# Patient Record
Sex: MALE | Race: WHITE | Employment: OTHER | URBAN - METROPOLITAN AREA
[De-identification: names, ages, dates, MRNs, and addresses within clinical notes are randomized per-mention and may not be internally consistent; named-entity substitution may affect disease eponyms.]

---

## 2017-05-16 PROBLEM — J96.01 ACUTE RESPIRATORY FAILURE WITH HYPOXIA (HCC): Status: ACTIVE | Noted: 2017-05-16

## 2017-05-20 LAB — LV EF: 49 %

## 2017-06-15 PROBLEM — I50.22 CHRONIC SYSTOLIC CONGESTIVE HEART FAILURE (HCC): Status: ACTIVE | Noted: 2017-06-15

## 2017-06-15 PROBLEM — I10 ESSENTIAL HYPERTENSION: Status: ACTIVE | Noted: 2017-06-15

## 2017-06-15 PROBLEM — J41.0 SIMPLE CHRONIC BRONCHITIS (HCC): Status: ACTIVE | Noted: 2017-06-15

## 2018-04-06 DIAGNOSIS — I50.40 SYSTOLIC AND DIASTOLIC CHF W/REDUCED LV FUNCTION, NYHA CLASS 4 (HCC): ICD-10-CM

## 2018-04-06 DIAGNOSIS — I10 ESSENTIAL HYPERTENSION: ICD-10-CM

## 2018-04-06 RX ORDER — FUROSEMIDE 20 MG/1
20 TABLET ORAL DAILY
Qty: 30 TABLET | Refills: 1 | Status: SHIPPED | OUTPATIENT
Start: 2018-04-06 | End: 2018-04-10 | Stop reason: DRUGHIGH

## 2018-04-06 RX ORDER — FUROSEMIDE 40 MG/1
40 TABLET ORAL DAILY
Qty: 60 TABLET | Refills: 1 | Status: SHIPPED | OUTPATIENT
Start: 2018-04-06 | End: 2018-09-07 | Stop reason: ALTCHOICE

## 2018-04-06 RX ORDER — LISINOPRIL 5 MG/1
5 TABLET ORAL DAILY
Qty: 30 TABLET | Refills: 3 | Status: SHIPPED | OUTPATIENT
Start: 2018-04-06 | End: 2018-10-05 | Stop reason: SDUPTHER

## 2018-04-09 DIAGNOSIS — I10 ESSENTIAL HYPERTENSION: ICD-10-CM

## 2018-04-10 ENCOUNTER — OFFICE VISIT (OUTPATIENT)
Dept: INTERNAL MEDICINE CLINIC | Age: 28
End: 2018-04-10
Payer: MEDICARE

## 2018-04-10 VITALS — DIASTOLIC BLOOD PRESSURE: 97 MMHG | SYSTOLIC BLOOD PRESSURE: 166 MMHG | HEART RATE: 87 BPM

## 2018-04-10 DIAGNOSIS — I50.42 CHRONIC COMBINED SYSTOLIC AND DIASTOLIC CONGESTIVE HEART FAILURE (HCC): Primary | ICD-10-CM

## 2018-04-10 DIAGNOSIS — I10 ESSENTIAL HYPERTENSION: ICD-10-CM

## 2018-04-10 DIAGNOSIS — G47.33 OSA (OBSTRUCTIVE SLEEP APNEA): ICD-10-CM

## 2018-04-10 PROCEDURE — G8427 DOCREV CUR MEDS BY ELIG CLIN: HCPCS | Performed by: INTERNAL MEDICINE

## 2018-04-10 PROCEDURE — 99213 OFFICE O/P EST LOW 20 MIN: CPT | Performed by: INTERNAL MEDICINE

## 2018-04-10 PROCEDURE — G8417 CALC BMI ABV UP PARAM F/U: HCPCS | Performed by: INTERNAL MEDICINE

## 2018-04-10 PROCEDURE — 1036F TOBACCO NON-USER: CPT | Performed by: INTERNAL MEDICINE

## 2018-04-10 RX ORDER — FUROSEMIDE 40 MG/1
40 TABLET ORAL DAILY
Qty: 30 TABLET | Refills: 3 | Status: SHIPPED | OUTPATIENT
Start: 2018-04-10 | End: 2018-07-16 | Stop reason: SDUPTHER

## 2018-07-16 RX ORDER — FUROSEMIDE 40 MG/1
40 TABLET ORAL DAILY
Qty: 30 TABLET | Refills: 3 | Status: SHIPPED | OUTPATIENT
Start: 2018-07-16 | End: 2018-07-25 | Stop reason: SDUPTHER

## 2018-07-28 RX ORDER — FUROSEMIDE 40 MG/1
40 TABLET ORAL DAILY
Qty: 30 TABLET | Refills: 3 | Status: SHIPPED | OUTPATIENT
Start: 2018-07-28 | End: 2018-09-07 | Stop reason: ALTCHOICE

## 2018-09-06 DIAGNOSIS — I10 ESSENTIAL HYPERTENSION: ICD-10-CM

## 2018-09-06 DIAGNOSIS — I50.40 SYSTOLIC AND DIASTOLIC CHF W/REDUCED LV FUNCTION, NYHA CLASS 4 (HCC): ICD-10-CM

## 2018-09-07 RX ORDER — FUROSEMIDE 20 MG/1
20 TABLET ORAL DAILY
Qty: 30 TABLET | Refills: 1 | Status: SHIPPED | OUTPATIENT
Start: 2018-09-07 | End: 2019-02-05 | Stop reason: CLARIF

## 2018-09-12 ENCOUNTER — TELEPHONE (OUTPATIENT)
Dept: INTERNAL MEDICINE CLINIC | Age: 28
End: 2018-09-12

## 2018-09-13 NOTE — TELEPHONE ENCOUNTER
Patient would like to know why the lasix 40 mg has been discontinued. Patient states that he has been taking both the 20 mg and 40 mg. Please advise. Thanks.

## 2018-09-18 DIAGNOSIS — I50.22 CHRONIC SYSTOLIC CONGESTIVE HEART FAILURE (HCC): Primary | ICD-10-CM

## 2018-09-18 RX ORDER — FUROSEMIDE 40 MG/1
40 TABLET ORAL DAILY
Qty: 30 TABLET | Refills: 3 | Status: SHIPPED | OUTPATIENT
Start: 2018-09-18 | End: 2019-02-05 | Stop reason: CLARIF

## 2018-10-05 DIAGNOSIS — I10 ESSENTIAL HYPERTENSION: ICD-10-CM

## 2018-10-05 RX ORDER — LISINOPRIL 5 MG/1
5 TABLET ORAL DAILY
Qty: 30 TABLET | Refills: 3 | Status: SHIPPED | OUTPATIENT
Start: 2018-10-05 | End: 2019-02-12 | Stop reason: SDUPTHER

## 2019-01-11 DIAGNOSIS — I10 ESSENTIAL HYPERTENSION: ICD-10-CM

## 2019-02-05 RX ORDER — FUROSEMIDE 40 MG/1
TABLET ORAL
Qty: 90 TABLET | Refills: 0 | Status: SHIPPED | OUTPATIENT
Start: 2019-02-05 | End: 2019-09-03 | Stop reason: SDUPTHER

## 2019-02-06 DIAGNOSIS — I10 ESSENTIAL HYPERTENSION: ICD-10-CM

## 2019-02-06 DIAGNOSIS — I50.40 SYSTOLIC AND DIASTOLIC CHF W/REDUCED LV FUNCTION, NYHA CLASS 4 (HCC): ICD-10-CM

## 2019-02-06 RX ORDER — LISINOPRIL 5 MG/1
5 TABLET ORAL DAILY
Qty: 30 TABLET | Refills: 0 | Status: CANCELLED | OUTPATIENT
Start: 2019-02-06

## 2019-02-06 RX ORDER — FUROSEMIDE 40 MG/1
TABLET ORAL
Qty: 30 TABLET | Refills: 0 | Status: CANCELLED | OUTPATIENT
Start: 2019-02-06

## 2019-02-06 RX ORDER — FUROSEMIDE 20 MG/1
20 TABLET ORAL DAILY
Qty: 30 TABLET | Refills: 0 | Status: CANCELLED | OUTPATIENT
Start: 2019-02-06

## 2019-02-12 ENCOUNTER — OFFICE VISIT (OUTPATIENT)
Dept: INTERNAL MEDICINE CLINIC | Age: 29
End: 2019-02-12

## 2019-02-12 VITALS
TEMPERATURE: 98.8 F | WEIGHT: 315 LBS | BODY MASS INDEX: 41.75 KG/M2 | HEIGHT: 73 IN | DIASTOLIC BLOOD PRESSURE: 104 MMHG | SYSTOLIC BLOOD PRESSURE: 174 MMHG | HEART RATE: 75 BPM | OXYGEN SATURATION: 96 %

## 2019-02-12 DIAGNOSIS — R10.12 LEFT UPPER QUADRANT PAIN: Primary | ICD-10-CM

## 2019-02-12 DIAGNOSIS — I50.22 CHRONIC SYSTOLIC CONGESTIVE HEART FAILURE (HCC): ICD-10-CM

## 2019-02-12 DIAGNOSIS — I10 ESSENTIAL HYPERTENSION: ICD-10-CM

## 2019-02-12 DIAGNOSIS — R07.9 CHEST PAIN, UNSPECIFIED TYPE: ICD-10-CM

## 2019-02-12 PROCEDURE — 99213 OFFICE O/P EST LOW 20 MIN: CPT | Performed by: INTERNAL MEDICINE

## 2019-02-12 RX ORDER — FUROSEMIDE 20 MG/1
20 TABLET ORAL DAILY
COMMUNITY
End: 2019-09-17

## 2019-02-12 RX ORDER — LISINOPRIL 5 MG/1
5 TABLET ORAL DAILY
Qty: 30 TABLET | Refills: 3 | Status: SHIPPED | OUTPATIENT
Start: 2019-02-12 | End: 2019-06-25 | Stop reason: SDUPTHER

## 2019-02-12 RX ORDER — FUROSEMIDE 20 MG/1
20 TABLET ORAL DAILY
Qty: 30 TABLET | Refills: 3 | Status: SHIPPED | OUTPATIENT
Start: 2019-02-12 | End: 2019-09-03 | Stop reason: SDUPTHER

## 2019-02-12 ASSESSMENT — PATIENT HEALTH QUESTIONNAIRE - PHQ9
2. FEELING DOWN, DEPRESSED OR HOPELESS: 0
SUM OF ALL RESPONSES TO PHQ QUESTIONS 1-9: 0
SUM OF ALL RESPONSES TO PHQ9 QUESTIONS 1 & 2: 0
SUM OF ALL RESPONSES TO PHQ QUESTIONS 1-9: 0
1. LITTLE INTEREST OR PLEASURE IN DOING THINGS: 0

## 2019-02-13 ENCOUNTER — TELEPHONE (OUTPATIENT)
Dept: INTERNAL MEDICINE CLINIC | Age: 29
End: 2019-02-13

## 2019-04-30 DIAGNOSIS — I10 ESSENTIAL HYPERTENSION: ICD-10-CM

## 2019-06-25 ENCOUNTER — TELEPHONE (OUTPATIENT)
Dept: INTERNAL MEDICINE CLINIC | Age: 29
End: 2019-06-25

## 2019-06-25 DIAGNOSIS — I10 ESSENTIAL HYPERTENSION: ICD-10-CM

## 2019-06-25 RX ORDER — LISINOPRIL 5 MG/1
5 TABLET ORAL DAILY
Qty: 30 TABLET | Refills: 3 | Status: SHIPPED | OUTPATIENT
Start: 2019-06-25 | End: 2019-09-03 | Stop reason: SDUPTHER

## 2019-06-25 NOTE — TELEPHONE ENCOUNTER
Contacted patient to remind him that Dr Nydia Treviño would like him to get his blood work done. Patient voiced understanding of this and will try to get the blood work done at [facility] on a weekend.

## 2019-09-03 DIAGNOSIS — I10 ESSENTIAL HYPERTENSION: ICD-10-CM

## 2019-09-03 RX ORDER — LISINOPRIL 5 MG/1
5 TABLET ORAL DAILY
Qty: 30 TABLET | Refills: 0 | Status: SHIPPED | OUTPATIENT
Start: 2019-09-03 | End: 2019-09-17 | Stop reason: SDUPTHER

## 2019-09-03 RX ORDER — FUROSEMIDE 40 MG/1
40 TABLET ORAL DAILY
Qty: 30 TABLET | Refills: 0 | Status: SHIPPED | OUTPATIENT
Start: 2019-09-03 | End: 2019-09-17 | Stop reason: SDUPTHER

## 2019-09-03 RX ORDER — FUROSEMIDE 20 MG/1
20 TABLET ORAL DAILY
Qty: 30 TABLET | Refills: 0 | Status: SHIPPED | OUTPATIENT
Start: 2019-09-03 | End: 2019-09-17 | Stop reason: SDUPTHER

## 2019-09-03 NOTE — TELEPHONE ENCOUNTER
Patient called requesting medication refills. Advised patient that he has cancelled 2 appointments and hasn't got his labs completed that Marianna Fairchild advised him about 6/25/2019. Offered him an appointment for today  Patient states he is out of town. Did schedule for 2 weeks September 17 2019. Advised only a 2 week supply will be called in to get him to his appointment. I reminded him to get labs completed as well. I asked him if he still had the lab orders he stated that registration has his paperwork because he was not fasting the time he went to complete them. Advised the day he gets them done if they do not have his paperwork to come to office and we will re print them. Advised him strongly to have labs completed before his visit.

## 2019-09-09 ENCOUNTER — HOSPITAL ENCOUNTER (OUTPATIENT)
Age: 29
Discharge: HOME | End: 2019-09-09

## 2019-09-09 DIAGNOSIS — I10 ESSENTIAL HYPERTENSION: ICD-10-CM

## 2019-09-09 LAB
ANION GAP SERPL CALCULATED.3IONS-SCNC: 9 MMOL/L (ref 7–16)
BUN BLDV-MCNC: 9 MG/DL (ref 6–20)
CALCIUM SERPL-MCNC: 9.4 MG/DL (ref 8.6–10.2)
CHLORIDE BLD-SCNC: 99 MMOL/L (ref 98–107)
CO2: 31 MMOL/L (ref 22–29)
CREAT SERPL-MCNC: 0.7 MG/DL (ref 0.7–1.2)
GFR AFRICAN AMERICAN: >60
GFR NON-AFRICAN AMERICAN: >60 ML/MIN/1.73
GLUCOSE BLD-MCNC: 106 MG/DL (ref 74–99)
POTASSIUM SERPL-SCNC: 4 MMOL/L (ref 3.5–5)
SODIUM BLD-SCNC: 139 MMOL/L (ref 132–146)

## 2019-09-09 PROCEDURE — 36415 COLL VENOUS BLD VENIPUNCTURE: CPT

## 2019-09-09 PROCEDURE — 80048 BASIC METABOLIC PNL TOTAL CA: CPT

## 2019-09-17 ENCOUNTER — OFFICE VISIT (OUTPATIENT)
Dept: INTERNAL MEDICINE CLINIC | Age: 29
End: 2019-09-17

## 2019-09-17 VITALS
SYSTOLIC BLOOD PRESSURE: 140 MMHG | OXYGEN SATURATION: 94 % | WEIGHT: 315 LBS | DIASTOLIC BLOOD PRESSURE: 84 MMHG | TEMPERATURE: 98.1 F | BODY MASS INDEX: 41.75 KG/M2 | HEIGHT: 73 IN | HEART RATE: 84 BPM

## 2019-09-17 DIAGNOSIS — I50.40 SYSTOLIC AND DIASTOLIC CHF W/REDUCED LV FUNCTION, NYHA CLASS 4 (HCC): ICD-10-CM

## 2019-09-17 DIAGNOSIS — E11.9 TYPE 2 DIABETES MELLITUS WITHOUT COMPLICATION, WITHOUT LONG-TERM CURRENT USE OF INSULIN (HCC): ICD-10-CM

## 2019-09-17 DIAGNOSIS — I10 ESSENTIAL HYPERTENSION: Primary | ICD-10-CM

## 2019-09-17 PROCEDURE — 99213 OFFICE O/P EST LOW 20 MIN: CPT | Performed by: INTERNAL MEDICINE

## 2019-09-17 RX ORDER — LISINOPRIL 5 MG/1
10 TABLET ORAL DAILY
Qty: 60 TABLET | Refills: 5 | Status: SHIPPED | OUTPATIENT
Start: 2019-09-17 | End: 2020-03-17 | Stop reason: SDUPTHER

## 2019-09-17 RX ORDER — FUROSEMIDE 40 MG/1
40 TABLET ORAL DAILY
Qty: 30 TABLET | Refills: 5 | Status: SHIPPED | OUTPATIENT
Start: 2019-09-17 | End: 2020-03-17 | Stop reason: SDUPTHER

## 2019-09-17 RX ORDER — FUROSEMIDE 20 MG/1
20 TABLET ORAL DAILY
Qty: 30 TABLET | Refills: 5 | Status: SHIPPED | OUTPATIENT
Start: 2019-09-17 | End: 2020-03-17 | Stop reason: SDUPTHER

## 2019-09-17 NOTE — PROGRESS NOTES
Internal Medicine Clinic Progress Note    NAME: Loren Bower        :  1990        MRN:  [id number]    No chief complaint on file. History of Present Illness  2019  Patient presents for routine follow-up and medication refills. States that he has been taking his medications for the last week and a half. However he is not compliant with his BiPAP. Otherwise states breathing is stable and has not needed rescue inhaler. Denies any chest pain. 2019  Patient presents for follow up of chf and medication refills. States he has not been taking prescribed lasix due to running out in the last 2 weeks. Otherwise sleep is improved and he feels well rested afterwards. Has not been using cpap. Breathing is stable. Admits to left upper quadrant pain worse with prolonged exertion and greasy foods. Denies fever, chills, nausea, emesis or diarrhea. 4/10/2018  Patient states he is breathing better however he has not been taking 60 mg of lasix. Patient only taking 20 mg. States he just got insurance and so should be able to get his bipap. Patient otherwise denies chest pain or shortness of breath. Review of Systems  Please see HPI above. All bolded are positive.   Gen: fever, chills, fatigue, weakness, diaphoresis, or unintentional weight change  Head: headache, vision change, hearing loss  Chest: chest pain/heaviness, palpitations  Lungs: shortness of breath, wheezing, coughing, hemoptysis  Abdomen: abdominal pain, nausea, vomiting, diarrhea, constipation, melena, hematochezia, hematemesis, or loss of appetite  Extremities: lower extremity edema, myalgias, arthralgias  Urinary: dysuria, hematuria, weak flow, or increase in frequency  Neurologic: lightheadedness, dizziness, confusion, syncope  Psychiatric: depression, suicidal ideation, or anxiety      Past Medical History:   Diagnosis Date    COPD (chronic obstructive pulmonary disease) (HCC)     HTN (hypertension)     Systolic and likely from running out of medications  Refuses flu vaccination     4/10/2018  States breathing has improved. Continue with 40 mg lasix  Order bipap  Have him back for recheck of blood pressure in 2 weeks  Watch salt intake  Counseled on weight loss  Repeat echocardiogram   Obtain blood work    Chronic stage 1 diastolic/systolic congestive heart failure [facility] class 4 with recent hospitalization  - EF of 49 %;   - Off the oxygen for last 4 days  - with ambulation maintained saturation of 88%  - started on lisinopril and Lasix  - started on metoprolol 12.5 mg daily; patient tolerating   - advised of 2 liter fluid restriction and decreased salt intake, record daily weights   - patient taking 60 mg of lasix     8/3/2017  Advised patient to continue taking lopressor and lisinopril as patient stated that he stopped taking. Lisinopril dose was decreased. He was counseled to take it daily and if having hypotension, lightheadedness, or other related symptoms to take it every other day. Patient stated agreement and understanding. Patient continues to take 60 mg lasix. Advised of fluid restriction of 2 liters .      Hx of apoorva  - follow up pulmonology in a month with Dr. Bekah Stern  - sleep study done with bipap/cpap titration  - has been noncompliant in the past due to financial difficulty  - made arrangements for bipap; he is in the process of obtaining bipap with social work  -  Still working on it     8/3/2017  Patient purchased bipap online. I advised him to continue to follow up with social work to obtain bipap.  He stated that he would .          COPD; will need PFT in the future  Will take anoro and see how he does; will add steroid if not controlled     8/3/2017  Was taking the Deidre Manzanilla; stopped taking it due to abdominal pain   Will need  PFT for formal evaluation   2 week visit Dr. Dion Cormier      Hypertension  Started on lisinopril and lasix  Metoprolol  controlled      Pulmonary HTN  Maybe due to acute exacerbation of CHF      Super morbid Obesity  advised about diet and exercise      Healthcare maintenance  Routine lab work (cbc, cmp, lipid panel, hiv screen, mg, phos, tsh, vitamin d); repeat blood work. Colonoscopy (age 48 with no risk factors, 40 or ten years earlier, whichever is first, in those with relatives of crc or adenoma before age 61; or crc or adenoma in 2 or more first degree relatives)   Vaccination (pneumovax, tetanus booster)      8/3/2017  Patient given repeat paper for bloodwork   Needs to get these as he is taking lasix                                                Health Maintenance Due   Topic Date Due    Pneumococcal 0-64 years Vaccine (1 of 1 - PPSV23) 06/09/1995    Varicella Vaccine (1 of 2 - 13+ 2-dose series) 06/09/2002    HIV screen  06/09/2004    DTaP/Tdap/Td vaccine (1 - Tdap) 06/09/2008    Flu vaccine (1) 09/01/2019     This patient was seen and discussed with Dr. Mehran Brooks.     [place], [place]  9/17/2019  2:24 PM

## 2020-03-17 RX ORDER — LISINOPRIL 5 MG/1
10 TABLET ORAL DAILY
Qty: 60 TABLET | Refills: 0 | Status: SHIPPED
Start: 2020-03-17 | End: 2020-04-10 | Stop reason: SDUPTHER

## 2020-03-17 RX ORDER — FUROSEMIDE 40 MG/1
40 TABLET ORAL DAILY
Qty: 30 TABLET | Refills: 0 | Status: SHIPPED
Start: 2020-03-17 | End: 2020-04-10 | Stop reason: SDUPTHER

## 2020-03-17 RX ORDER — FUROSEMIDE 20 MG/1
20 TABLET ORAL DAILY
Qty: 30 TABLET | Refills: 0 | Status: SHIPPED
Start: 2020-03-17 | End: 2020-04-10 | Stop reason: SDUPTHER

## 2020-03-17 NOTE — TELEPHONE ENCOUNTER
Patient was former Dr Shawna Ramirez patient. Needing a Friday D/T work schedule.  Patient will be seeing you on April 10 but only has 1 more week of refills can you send in 1 month to get him to his next appointment

## 2020-04-10 ENCOUNTER — HOSPITAL ENCOUNTER (OUTPATIENT)
Age: 30
Discharge: HOME | End: 2020-04-10

## 2020-04-10 ENCOUNTER — OFFICE VISIT (OUTPATIENT)
Dept: INTERNAL MEDICINE CLINIC | Age: 30
End: 2020-04-10

## 2020-04-10 VITALS
OXYGEN SATURATION: 93 % | BODY MASS INDEX: 41.75 KG/M2 | SYSTOLIC BLOOD PRESSURE: 135 MMHG | DIASTOLIC BLOOD PRESSURE: 75 MMHG | HEART RATE: 72 BPM | TEMPERATURE: 98.1 F | HEIGHT: 73 IN | WEIGHT: 315 LBS

## 2020-04-10 LAB
ALBUMIN SERPL-MCNC: 4.2 G/DL (ref 3.5–5.2)
ALP BLD-CCNC: 60 U/L (ref 40–129)
ALT SERPL-CCNC: 33 U/L (ref 0–40)
ANION GAP SERPL CALCULATED.3IONS-SCNC: 11 MMOL/L (ref 7–16)
AST SERPL-CCNC: 27 U/L (ref 0–39)
BASOPHILS ABSOLUTE: 0.08 E9/L (ref 0–0.2)
BASOPHILS RELATIVE PERCENT: 0.8 % (ref 0–2)
BILIRUB SERPL-MCNC: 0.5 MG/DL (ref 0–1.2)
BUN BLDV-MCNC: 10 MG/DL (ref 6–20)
CALCIUM SERPL-MCNC: 9.6 MG/DL (ref 8.6–10.2)
CHLORIDE BLD-SCNC: 102 MMOL/L (ref 98–107)
CHOLESTEROL, FASTING: 118 MG/DL (ref 0–199)
CO2: 24 MMOL/L (ref 22–29)
CREAT SERPL-MCNC: 0.8 MG/DL (ref 0.7–1.2)
EOSINOPHILS ABSOLUTE: 0.07 E9/L (ref 0.05–0.5)
EOSINOPHILS RELATIVE PERCENT: 0.7 % (ref 0–6)
GFR AFRICAN AMERICAN: >60
GFR NON-AFRICAN AMERICAN: >60 ML/MIN/1.73
GLUCOSE FASTING: 90 MG/DL (ref 74–99)
HBA1C MFR BLD: 5.4 % (ref 4–5.6)
HCT VFR BLD CALC: 47 % (ref 37–54)
HDLC SERPL-MCNC: 41 MG/DL
HEMOGLOBIN: 15 G/DL (ref 12.5–16.5)
IMMATURE GRANULOCYTES #: 0.02 E9/L
IMMATURE GRANULOCYTES %: 0.2 % (ref 0–5)
LDL CHOLESTEROL CALCULATED: 61 MG/DL (ref 0–99)
LYMPHOCYTES ABSOLUTE: 3.1 E9/L (ref 1.5–4)
LYMPHOCYTES RELATIVE PERCENT: 31.8 % (ref 20–42)
MAGNESIUM: 2.2 MG/DL (ref 1.6–2.6)
MCH RBC QN AUTO: 29.8 PG (ref 26–35)
MCHC RBC AUTO-ENTMCNC: 31.9 % (ref 32–34.5)
MCV RBC AUTO: 93.4 FL (ref 80–99.9)
MONOCYTES ABSOLUTE: 0.63 E9/L (ref 0.1–0.95)
MONOCYTES RELATIVE PERCENT: 6.5 % (ref 2–12)
NEUTROPHILS ABSOLUTE: 5.85 E9/L (ref 1.8–7.3)
NEUTROPHILS RELATIVE PERCENT: 60 % (ref 43–80)
PDW BLD-RTO: 14.1 FL (ref 11.5–15)
PHOSPHORUS: 3.2 MG/DL (ref 2.5–4.5)
PLATELET # BLD: 321 E9/L (ref 130–450)
PMV BLD AUTO: 10.6 FL (ref 7–12)
POTASSIUM SERPL-SCNC: 4.6 MMOL/L (ref 3.5–5)
RBC # BLD: 5.03 E12/L (ref 3.8–5.8)
SODIUM BLD-SCNC: 137 MMOL/L (ref 132–146)
TOTAL PROTEIN: 7.8 G/DL (ref 6.4–8.3)
TRIGLYCERIDE, FASTING: 81 MG/DL (ref 0–149)
VLDLC SERPL CALC-MCNC: 16 MG/DL
WBC # BLD: 9.8 E9/L (ref 4.5–11.5)

## 2020-04-10 PROCEDURE — 84100 ASSAY OF PHOSPHORUS: CPT

## 2020-04-10 PROCEDURE — 36415 COLL VENOUS BLD VENIPUNCTURE: CPT

## 2020-04-10 PROCEDURE — 83036 HEMOGLOBIN GLYCOSYLATED A1C: CPT

## 2020-04-10 PROCEDURE — 99212 OFFICE O/P EST SF 10 MIN: CPT | Performed by: FAMILY MEDICINE

## 2020-04-10 PROCEDURE — 80053 COMPREHEN METABOLIC PANEL: CPT

## 2020-04-10 PROCEDURE — 80061 LIPID PANEL: CPT

## 2020-04-10 PROCEDURE — 85025 COMPLETE CBC W/AUTO DIFF WBC: CPT

## 2020-04-10 PROCEDURE — 99213 OFFICE O/P EST LOW 20 MIN: CPT | Performed by: FAMILY MEDICINE

## 2020-04-10 PROCEDURE — 83735 ASSAY OF MAGNESIUM: CPT

## 2020-04-10 RX ORDER — LISINOPRIL 5 MG/1
10 TABLET ORAL DAILY
Qty: 60 TABLET | Refills: 0 | Status: SHIPPED
Start: 2020-04-10 | End: 2020-04-13 | Stop reason: SDUPTHER

## 2020-04-10 RX ORDER — FUROSEMIDE 40 MG/1
40 TABLET ORAL DAILY
Qty: 30 TABLET | Refills: 0 | Status: SHIPPED
Start: 2020-04-10 | End: 2020-04-13 | Stop reason: SDUPTHER

## 2020-04-10 RX ORDER — METOPROLOL SUCCINATE 25 MG/1
25 TABLET, EXTENDED RELEASE ORAL DAILY
Qty: 90 TABLET | Refills: 1 | Status: SHIPPED
Start: 2020-04-10 | End: 2020-04-13 | Stop reason: SDUPTHER

## 2020-04-10 RX ORDER — FUROSEMIDE 20 MG/1
20 TABLET ORAL DAILY
Qty: 30 TABLET | Refills: 0 | Status: SHIPPED
Start: 2020-04-10 | End: 2020-04-13 | Stop reason: SDUPTHER

## 2020-04-10 ASSESSMENT — ENCOUNTER SYMPTOMS
DIARRHEA: 0
WHEEZING: 0
NAUSEA: 0
SORE THROAT: 0
CONSTIPATION: 0
SHORTNESS OF BREATH: 0
VOMITING: 0
PHOTOPHOBIA: 0
COUGH: 0
RHINORRHEA: 0

## 2020-04-10 ASSESSMENT — PATIENT HEALTH QUESTIONNAIRE - PHQ9
SUM OF ALL RESPONSES TO PHQ QUESTIONS 1-9: 0
1. LITTLE INTEREST OR PLEASURE IN DOING THINGS: 0
SUM OF ALL RESPONSES TO PHQ9 QUESTIONS 1 & 2: 0
2. FEELING DOWN, DEPRESSED OR HOPELESS: 0
SUM OF ALL RESPONSES TO PHQ QUESTIONS 1-9: 0

## 2020-04-10 NOTE — PROGRESS NOTES
[facility]    Subjective:  26 y.o. male who presents in office today regarding  to establish with a new provider    Established with provider  The patient's last PCP was Dr. Edgard Verdugo last seen Sept 2019. The patient is also under the care of no other physicians. Active comorbidities include:  HTN - Lisinopril 10 mg daily  Combined CHF - Lasix BID, 40 +20. COPD  Pt was on O2 some time ago following hospitalization and says he doesn't often feel SOB but has low O2 saturation frequently. These tend to be in the 80s. He is not on any therapy for this and doesn't have any formal testing in his past. He never smoked but grew up with significant second-hand smoke exposure and was born premature. Obesity  The pt has been on the keto diet for over a year and continues to lose weight. He reports improved breathing and energy. Past Medical History:   Diagnosis Date    COPD (chronic obstructive pulmonary disease) (HCC)     HTN (hypertension)     Systolic and diastolic CHF w/reduced LV function, NYHA class 4 (HCC)        Past Surgical History:   Procedure Laterality Date    TONSILLECTOMY         Family History   Problem Relation Age of Onset    Asthma Maternal Uncle     Diabetes Maternal Grandmother     Heart Disease Maternal Grandmother     COPD Paternal Grandmother        Social History     Tobacco History     Smoking Status  Never Smoker    Smokeless Tobacco Use  Never Used          Alcohol History     Alcohol Use Status  Yes Comment  occ          Drug Use     Drug Use Status  No          Sexual Activity     Sexually Active  Not Asked            Parents and siblings smoked growing up and pt noted he was born premature. Lives with sister in his apt. No children.     Allergies   Allergen Reactions    Sulfa Antibiotics Rash       Current Outpatient Medications on File Prior to Visit   Medication Sig Dispense Refill    furosemide (LASIX) 40 MG tablet Take 1 tablet by mouth daily 30 tablet 0   

## 2020-04-10 NOTE — PATIENT INSTRUCTIONS
breathe in and out as deeply and quickly as you can. · You may repeat some tests after you inhale a medicine that expands your airways. · You may breathe certain gases, such as 100% oxygen or a mixture of helium and air. · For body plethysmography, you sit inside a small fnik with windows. The fink measures pressure changes that occur as you breathe. The therapist may urge you to breathe deeply during some of the tests to get the best results. You may have a blood test to check oxygen and carbon dioxide levels in your blood before, during, or after your lung function tests. How long do these tests take? The testing may take from 5 to 30 minutes, depending on how many tests you have. What happens after these tests? · You will probably be able to go home right after the tests. · You can go back to your normal activities right away. Follow-up care is a key part of your treatment and safety. Be sure to make and go to all appointments, and call your doctor if you are having problems. It's also a good idea to know your test results and keep a list of the medicines you take. Where can you learn more? Go to [email or web address]://[email or web address].[email or web address]. org and sign in to your [facility] account. Enter E446 in the [employer] box to learn more about \"Lung Function Tests: About These Tests. \"     If you do not have an account, please click on the \"Sign Up Now\" link. Current as of: June 9, 2019Content Version: 12.4  © 9838-8364 Healthwise, Incorporated. Care instructions adapted under license by [facility] ([facility]). If you have questions about a medical condition or this instruction, always ask your healthcare professional. [place] [address] any warranty or liability for your use of this information.

## 2020-04-13 RX ORDER — LISINOPRIL 5 MG/1
10 TABLET ORAL DAILY
Qty: 60 TABLET | Refills: 0 | Status: SHIPPED
Start: 2020-04-13 | End: 2020-06-12 | Stop reason: SDUPTHER

## 2020-04-13 RX ORDER — METOPROLOL SUCCINATE 25 MG/1
25 TABLET, EXTENDED RELEASE ORAL DAILY
Qty: 90 TABLET | Refills: 1 | Status: SHIPPED
Start: 2020-04-13 | End: 2020-06-12 | Stop reason: SDUPTHER

## 2020-04-13 RX ORDER — FUROSEMIDE 20 MG/1
20 TABLET ORAL DAILY
Qty: 30 TABLET | Refills: 0 | Status: SHIPPED
Start: 2020-04-13 | End: 2020-06-12 | Stop reason: SDUPTHER

## 2020-04-13 RX ORDER — FUROSEMIDE 40 MG/1
40 TABLET ORAL DAILY
Qty: 30 TABLET | Refills: 0 | Status: SHIPPED
Start: 2020-04-13 | End: 2020-06-12 | Stop reason: SDUPTHER

## 2020-06-03 ENCOUNTER — HOSPITAL ENCOUNTER (EMERGENCY)
Age: 30
Discharge: HOME | End: 2020-06-03

## 2020-06-03 VITALS
HEIGHT: 74 IN | DIASTOLIC BLOOD PRESSURE: 98 MMHG | RESPIRATION RATE: 18 BRPM | HEART RATE: 91 BPM | OXYGEN SATURATION: 97 % | WEIGHT: 315 LBS | TEMPERATURE: 97.9 F | SYSTOLIC BLOOD PRESSURE: 170 MMHG | BODY MASS INDEX: 40.43 KG/M2

## 2020-06-03 PROCEDURE — 6360000002 HC RX W HCPCS: Performed by: PHYSICIAN ASSISTANT

## 2020-06-03 PROCEDURE — 90471 IMMUNIZATION ADMIN: CPT | Performed by: PHYSICIAN ASSISTANT

## 2020-06-03 PROCEDURE — 99282 EMERGENCY DEPT VISIT SF MDM: CPT

## 2020-06-03 PROCEDURE — 90715 TDAP VACCINE 7 YRS/> IM: CPT | Performed by: PHYSICIAN ASSISTANT

## 2020-06-03 PROCEDURE — 12002 RPR S/N/AX/GEN/TRNK2.6-7.5CM: CPT

## 2020-06-03 RX ADMIN — TETANUS TOXOID, REDUCED DIPHTHERIA TOXOID AND ACELLULAR PERTUSSIS VACCINE, ADSORBED 0.5 ML: 5; 2.5; 8; 8; 2.5 SUSPENSION INTRAMUSCULAR at 23:34

## 2020-06-04 ENCOUNTER — CARE COORDINATION (OUTPATIENT)
Dept: CARE COORDINATION | Age: 30
End: 2020-06-04

## 2020-06-04 NOTE — ED PROVIDER NOTES
[facility]                                                                                                                                    Department of Emergency Medicine   ED  Provider Note  Admit Date/RoomTime: 6/3/2020 10:55 PM  ED Room: 08/08        HPI:  6/3/20,   Time: 11:30 PM EDT         Bebe Barnes is a 26 y.o. male presenting to the ED for left hand laceration, beginning just prior to arrival.  The complaint has been persistent, mild in severity, and worsened by movement of left hand. The patient states that he was doing some target shooting at home when he advertently cut the back of his left hand. He states that he cleaned up the area pretty thoroughly at home but it would not stop bleeding. He states that his mom is the one who insisted he come in to be evaluated. The patient reports that he is moving his fingers well. Denies any loss of sensation. Last tetanus is uncertain. The patient has no other injuries or complaints. ROS:     Constitutional: Negative for fever and chills  HENT: Negative for ear pain, sore throat and sinus pressure  Eyes: Negative for pain, discharge and redness  Respiratory:  Negative for shortness of breath, cough and wheezing  Cardiovascular: Negative for CP, edema or palpitations  Gastrointestinal: Negative for nausea, vomiting, diarrhea and abdominal distention  Genitourinary: Negative for dysuria and frequency  Musculoskeletal: See  HPI  Skin: See HPI  Neurological: Negative for weakness and headaches  Hematological: Negative for adenopathy    All other systems reviewed and are negative      -------------------------------- PAST HISTORY ----------------------------------  Past Medical History:  has a past medical history of COPD (chronic obstructive pulmonary disease) ([facility] [address] HTN (hypertension), and Systolic and diastolic CHF w/reduced LV function, NYHA class 4 ([address]     Past Surgical History:  has a past surgical history that includes Tonsillectomy. Social History:  reports that he has never smoked. He has never used smokeless tobacco. He reports current alcohol use. He reports that he does not use drugs. Family History: family history includes Asthma in his maternal uncle; COPD in his paternal grandmother; Diabetes in his maternal grandmother; Heart Disease in his maternal grandmother. The patients home medications have been reviewed. Allergies: Sulfa antibiotics    --------------------------------- RESULTS ------------------------------------------  All laboratory and radiology results have been personally reviewed by myself   LABS:  No results found for this visit on 06/03/20. RADIOLOGY:  Interpreted by Radiologist.  No orders to display       ----------------- NURSING NOTES AND VITALS REVIEWED ---------------   The nursing notes within the ED encounter and vital signs as below have been reviewed. BP (!) 170/98   Pulse 91   Temp 97.9 °F (36.6 °C) (Oral)   Resp 18   Ht 6' 2\" (1.88 m)   Wt (!) 383 lb (173.7 kg)   SpO2 97%   BMI 49.17 kg/m²   Oxygen Saturation Interpretation: Normal      --------------------------------PHYSICAL EXAM------------------------------------      Constitutional/General: Alert and oriented x3, well appearing, non toxic in NAD  Head: NC/AT  Eyes: PERRL, EOMI  Mouth: Oropharynx clear, handling secretions, no trismus  Neck: Supple, full ROM, no meningeal signs  Pulmonary: Lungs clear to auscultation bilaterally, no wheezes, rales, or rhonchi. Not in respiratory distress  Cardiovascular:  Regular rate and rhythm, no murmurs, gallops, or rubs. 2+ distal pulses  Extremities: Moves all extremities x 4.  3 cm laceration noted to the dorsal surface of the left hand over the fourth and fifth metacarpals. Mild gaping and bleeding noted at the site. warm and well perfused  Skin: warm and dry without rash  Neurologic: GCS 15,  Intact.   No focal deficits  Psych: Normal

## 2020-06-04 NOTE — CARE COORDINATION
patient will be contacted by nurse care manager for worsening symptoms. Pt will be further monitored by COVID Loop Team based on severity of symptoms and risk factors.

## 2020-06-12 RX ORDER — FUROSEMIDE 40 MG/1
40 TABLET ORAL DAILY
Qty: 30 TABLET | Refills: 0 | Status: SHIPPED
Start: 2020-06-12 | End: 2020-07-21 | Stop reason: SDUPTHER

## 2020-06-12 RX ORDER — LISINOPRIL 5 MG/1
10 TABLET ORAL DAILY
Qty: 60 TABLET | Refills: 0 | Status: SHIPPED
Start: 2020-06-12 | End: 2020-07-21 | Stop reason: SDUPTHER

## 2020-06-12 RX ORDER — METOPROLOL SUCCINATE 25 MG/1
25 TABLET, EXTENDED RELEASE ORAL DAILY
Qty: 90 TABLET | Refills: 1 | Status: SHIPPED
Start: 2020-06-12 | End: 2020-07-21 | Stop reason: SDUPTHER

## 2020-06-12 RX ORDER — FUROSEMIDE 20 MG/1
20 TABLET ORAL DAILY
Qty: 30 TABLET | Refills: 0 | Status: SHIPPED
Start: 2020-06-12 | End: 2020-07-21 | Stop reason: SDUPTHER

## 2020-07-21 ENCOUNTER — OFFICE VISIT (OUTPATIENT)
Dept: FAMILY MEDICINE CLINIC | Age: 30
End: 2020-07-21

## 2020-07-21 VITALS
WEIGHT: 315 LBS | HEIGHT: 74 IN | DIASTOLIC BLOOD PRESSURE: 78 MMHG | TEMPERATURE: 97.4 F | OXYGEN SATURATION: 93 % | HEART RATE: 82 BPM | BODY MASS INDEX: 40.43 KG/M2 | SYSTOLIC BLOOD PRESSURE: 134 MMHG

## 2020-07-21 PROBLEM — Z01.89: Status: ACTIVE | Noted: 2020-07-21

## 2020-07-21 PROBLEM — K04.7 PERIAPICAL ABSCESS WITHOUT SINUS TRACT: Status: ACTIVE | Noted: 2020-07-21

## 2020-07-21 PROBLEM — K02.9 DENTAL CARIES: Status: ACTIVE | Noted: 2020-07-21

## 2020-07-21 PROCEDURE — 99214 OFFICE O/P EST MOD 30 MIN: CPT | Performed by: NURSE PRACTITIONER

## 2020-07-21 RX ORDER — FUROSEMIDE 20 MG/1
20 TABLET ORAL DAILY
Qty: 90 TABLET | Refills: 0 | Status: SHIPPED
Start: 2020-07-21 | End: 2020-09-30 | Stop reason: SDUPTHER

## 2020-07-21 RX ORDER — FUROSEMIDE 40 MG/1
40 TABLET ORAL DAILY
Qty: 90 TABLET | Refills: 0 | Status: SHIPPED
Start: 2020-07-21 | End: 2020-09-30 | Stop reason: SDUPTHER

## 2020-07-21 RX ORDER — LISINOPRIL 5 MG/1
10 TABLET ORAL DAILY
Qty: 180 TABLET | Refills: 0 | Status: SHIPPED
Start: 2020-07-21 | End: 2020-09-30 | Stop reason: SDUPTHER

## 2020-07-21 RX ORDER — METOPROLOL SUCCINATE 25 MG/1
25 TABLET, EXTENDED RELEASE ORAL DAILY
Qty: 90 TABLET | Refills: 0 | Status: SHIPPED
Start: 2020-07-21 | End: 2020-09-30 | Stop reason: SDUPTHER

## 2020-07-21 ASSESSMENT — ENCOUNTER SYMPTOMS
SHORTNESS OF BREATH: 1
WHEEZING: 0
VOMITING: 0
CONSTIPATION: 0
COUGH: 0
DIARRHEA: 0
NAUSEA: 0

## 2020-07-21 NOTE — PROGRESS NOTES
or organizations: Not on file     Relationship status: Not on file    Intimate partner violence     Fear of current or ex partner: Not on file     Emotionally abused: Not on file     Physically abused: Not on file     Forced sexual activity: Not on file   Other Topics Concern    Not on file   Social History Narrative    Not on file        Allergies   Allergen Reactions    Sulfa Antibiotics Rash        Prior to Visit Medications    Medication Sig Taking? Authorizing Provider   furosemide (LASIX) 20 MG tablet Take 1 tablet by mouth daily Yes Linda Spears APRN - CNP   furosemide (LASIX) 40 MG tablet Take 1 tablet by mouth daily Yes Linda Spears APRN - CNP   lisinopril (PRINIVIL;ZESTRIL) 5 MG tablet Take 2 tablets by mouth daily Yes Linda Spears APRN - CNP   metoprolol succinate (TOPROL XL) 25 MG extended release tablet Take 1 tablet by mouth daily Yes ADRIANNE Plummer - CNP       Review of Systems   Constitutional: Positive for unexpected weight change (weight increased over last few weeks due to no lasix). Negative for chills, diaphoresis and fever. Respiratory: Positive for shortness of breath (occasionally). Negative for cough and wheezing. Cardiovascular: Positive for leg swelling (ran out of lasix about 2 weeks ago). Negative for chest pain and palpitations. Gastrointestinal: Negative for constipation, diarrhea, nausea and vomiting. Genitourinary: Negative for difficulty urinating. Neurological: Positive for dizziness (occasionally with standing from sitting position). Negative for weakness and headaches. Physical Exam  Constitutional:       Appearance: He is well-developed. HENT:      Head: Normocephalic and atraumatic. Neck:      Thyroid: No thyromegaly. Trachea: No tracheal deviation. Cardiovascular:      Rate and Rhythm: Normal rate and regular rhythm. Heart sounds: No murmur.    Pulmonary:      Effort: Pulmonary effort is normal.      Breath sounds: Normal breath sounds. Abdominal:      General: Bowel sounds are normal.      Palpations: Abdomen is soft. Tenderness: There is no abdominal tenderness. Musculoskeletal: Normal range of motion. Lymphadenopathy:      Cervical: No cervical adenopathy. Skin:     General: Skin is warm and dry. Neurological:      Mental Status: He is alert and oriented to person, place, and time. Psychiatric:         Behavior: Behavior normal.         Vitals:    07/21/20 0930 07/21/20 0933   BP: (!) 155/90 134/78   Pulse: 82    Temp: 97.4 °F (36.3 °C)    TempSrc: Tympanic    SpO2: 93%    Weight: (!) 384 lb (174.2 kg)    Height: 6' 2\" (1.88 m)        Assessment/Plan:  Mariann Jimenez was seen today for medication refill. Diagnoses and all orders for this visit:    Essential hypertension  -     lisinopril (PRINIVIL;ZESTRIL) 5 MG tablet; Take 2 tablets by mouth daily  - The current medical regimen is effective;  continue present plan and medications. - The patient is asked to make an attempt to improve diet and exercise patterns to aid in medical management of this problem. - Low sodium diet less than 2000 mg daily    Systolic and diastolic CHF w/reduced LV function, NYHA class 4 (HCC)  -     furosemide (LASIX) 20 MG tablet; Take 1 tablet by mouth daily  -     furosemide (LASIX) 40 MG tablet; Take 1 tablet by mouth daily  -     metoprolol succinate (TOPROL XL) 25 MG extended release tablet; Take 1 tablet by mouth daily  - Reviewed side effects of medication and patient verbalizes understanding.   - The current medical regimen is effective;  continue present plan and medications. - Watch for weight gain  - Due for Echo    Chronic obstructive pulmonary disease, unspecified COPD type ([facility] [place] [address]  -  Due for PFTs    Encounter to discuss test results  -  Reviewed recent lab work    As above. Call or go to ED immediately if symptoms worsen or persist.  No follow-ups on file. , or sooner if necessary.       After Visit Summary and given to patient at the end of visit. Counseled regarding above diagnosis, including possible risks and complications,  especially if left uncontrolled. Counseled regarding thepossible side effects, risks, benefits and alternatives to treatment; patient and/or guardian verbalizes understanding, agrees, feels comfortable with and wishes to proceed with above treatment plan. Advised patient to call with any new medication issues, and read all Rx info from pharmacy to assure aware of all possible risks and side effects of medication before taking. Advised patient regarding importance of keeping up with recommended health maintenance and to schedule as soon as possible if overdue, as this is important in assessing for undiagnosed pathology,especially cancer, as well as protecting against potentially harmful/life threatening disease. Patient and/or guardian verbalizes understanding and agrees with above counseling, assessment and plan.

## 2020-09-29 NOTE — TELEPHONE ENCOUNTER
Pt needs all four of his meds refilled. He is scheduled as a new patient on 10/22, but his meds are going to run out before that appt. He wants to know if you would refill them until he comes in? He uses [facility] in [facility] on [address].

## 2020-09-30 RX ORDER — LISINOPRIL 5 MG/1
10 TABLET ORAL DAILY
Qty: 180 TABLET | Refills: 0 | Status: SHIPPED | OUTPATIENT
Start: 2020-09-30 | End: 2020-12-04 | Stop reason: SDUPTHER

## 2020-09-30 RX ORDER — METOPROLOL SUCCINATE 25 MG/1
25 TABLET, EXTENDED RELEASE ORAL DAILY
Qty: 90 TABLET | Refills: 0 | Status: SHIPPED | OUTPATIENT
Start: 2020-09-30 | End: 2020-12-04 | Stop reason: SDUPTHER

## 2020-09-30 RX ORDER — FUROSEMIDE 20 MG/1
20 TABLET ORAL DAILY
Qty: 90 TABLET | Refills: 0 | Status: SHIPPED | OUTPATIENT
Start: 2020-09-30 | End: 2020-12-04 | Stop reason: SDUPTHER

## 2020-09-30 RX ORDER — FUROSEMIDE 40 MG/1
40 TABLET ORAL DAILY
Qty: 90 TABLET | Refills: 0 | Status: SHIPPED | OUTPATIENT
Start: 2020-09-30 | End: 2020-12-04 | Stop reason: SDUPTHER

## 2020-10-22 ENCOUNTER — OFFICE VISIT (OUTPATIENT)
Dept: FAMILY MEDICINE CLINIC | Age: 30
End: 2020-10-22

## 2020-10-22 VITALS
TEMPERATURE: 97.7 F | BODY MASS INDEX: 41.75 KG/M2 | SYSTOLIC BLOOD PRESSURE: 152 MMHG | HEIGHT: 73 IN | OXYGEN SATURATION: 93 % | DIASTOLIC BLOOD PRESSURE: 72 MMHG | RESPIRATION RATE: 18 BRPM | WEIGHT: 315 LBS | HEART RATE: 80 BPM

## 2020-10-22 PROCEDURE — 99202 OFFICE O/P NEW SF 15 MIN: CPT | Performed by: FAMILY MEDICINE

## 2020-10-22 NOTE — PROGRESS NOTES
10/22/20 1613   BP: (!) 152/72   Pulse:    Resp:    Temp:    SpO2:      Physical Exam  Constitutional:       General: He is not in acute distress. Appearance: Normal appearance. He is well-developed. He is obese. He is not ill-appearing, toxic-appearing or diaphoretic. HENT:      Head: Normocephalic and atraumatic. Right Ear: External ear normal.      Left Ear: External ear normal.      Nose: Nose normal.      Mouth/Throat:      Pharynx: No oropharyngeal exudate. Eyes:      General:         Right eye: No discharge. Left eye: No discharge. Conjunctiva/sclera: Conjunctivae normal.      Pupils: Pupils are equal, round, and reactive to light. Neck:      Musculoskeletal: Normal range of motion and neck supple. Cardiovascular:      Rate and Rhythm: Normal rate and regular rhythm. Pulses: Normal pulses. Heart sounds: Normal heart sounds. No murmur. No friction rub. No gallop. Comments: Heart sounds distant due to body habitus. Pulmonary:      Effort: Pulmonary effort is normal. No respiratory distress. Breath sounds: No wheezing or rales. Comments: Breath sounds diminished due to body habitus. Abdominal:      General: Bowel sounds are normal. There is no distension. Palpations: Abdomen is soft. Tenderness: There is no abdominal tenderness. There is no guarding or rebound. Comments: Large pannus   Lymphadenopathy:      Cervical: No cervical adenopathy. Neurological:      Mental Status: He is alert and oriented to person, place, and time. Psychiatric:         Behavior: Behavior normal.         Thought Content:  Thought content normal.         Judgment: Judgment normal.         Results for orders placed or performed during the hospital encounter of 04/10/20   HEMOGLOBIN A1C   Result Value Ref Range    Hemoglobin A1C 5.4 4.0 - 5.6 %   CBC Auto Differential   Result Value Ref Range    WBC 9.8 4.5 - 11.5 E9/L    RBC 5.03 3.80 - 5.80 E12/L    Hemoglobin 15.0 12.5 - 16.5 g/dL    Hematocrit 47.0 37.0 - 54.0 %    MCV 93.4 80.0 - 99.9 fL    MCH 29.8 26.0 - 35.0 pg    MCHC 31.9 (L) 32.0 - 34.5 %    RDW 14.1 11.5 - 15.0 fL    Platelets 520 510 - 772 E9/L    MPV 10.6 7.0 - 12.0 fL    Neutrophils % 60.0 43.0 - 80.0 %    Immature Granulocytes % 0.2 0.0 - 5.0 %    Lymphocytes % 31.8 20.0 - 42.0 %    Monocytes % 6.5 2.0 - 12.0 %    Eosinophils % 0.7 0.0 - 6.0 %    Basophils % 0.8 0.0 - 2.0 %    Neutrophils Absolute 5.85 1.80 - 7.30 E9/L    Immature Granulocytes # 0.02 E9/L    Lymphocytes Absolute 3.10 1.50 - 4.00 E9/L    Monocytes Absolute 0.63 0.10 - 0.95 E9/L    Eosinophils Absolute 0.07 0.05 - 0.50 E9/L    Basophils Absolute 0.08 0.00 - 0.20 E9/L   MAGNESIUM   Result Value Ref Range    Magnesium 2.2 1.6 - 2.6 mg/dL   Phosphorus   Result Value Ref Range    Phosphorus 3.2 2.5 - 4.5 mg/dL   Comprehensive Metabolic Panel, Fasting   Result Value Ref Range    Sodium 137 132 - 146 mmol/L    Potassium 4.6 3.5 - 5.0 mmol/L    Chloride 102 98 - 107 mmol/L    CO2 24 22 - 29 mmol/L    Anion Gap 11 7 - 16 mmol/L    Glucose, Fasting 90 74 - 99 mg/dL    BUN 10 6 - 20 mg/dL    CREATININE 0.8 0.7 - 1.2 mg/dL    GFR Non-African American >60 >=60 mL/min/1.73    GFR African American >60     Calcium 9.6 8.6 - 10.2 mg/dL    Total Protein 7.8 6.4 - 8.3 g/dL    Alb 4.2 3.5 - 5.2 g/dL    Total Bilirubin 0.5 0.0 - 1.2 mg/dL    Alkaline Phosphatase 60 40 - 129 U/L    ALT 33 0 - 40 U/L    AST 27 0 - 39 U/L   Lipid, Fasting   Result Value Ref Range    Cholesterol, Fasting 118 0 - 199 mg/dL    Triglyceride, Fasting 81 0 - 149 mg/dL    HDL 41 >40 mg/dL    LDL Calculated 61 0 - 99 mg/dL    VLDL Cholesterol Calculated 16 mg/dL         Assessment and Plan:  Benny Tavares was seen today for established new doctor. Diagnoses and all orders for this visit:    Essential hypertension  -     Comprehensive Metabolic Panel; Future  -     CBC; Future  -     TSH without Reflex;  Future  -     Lipid Panel;

## 2020-12-04 RX ORDER — METOPROLOL SUCCINATE 25 MG/1
25 TABLET, EXTENDED RELEASE ORAL DAILY
Qty: 90 TABLET | Refills: 0 | Status: SHIPPED
Start: 2020-12-04 | End: 2021-04-09 | Stop reason: SDUPTHER

## 2020-12-04 RX ORDER — LISINOPRIL 5 MG/1
20 TABLET ORAL DAILY
Qty: 90 TABLET | Refills: 0 | Status: SHIPPED
Start: 2020-12-04 | End: 2020-12-22 | Stop reason: DRUGHIGH

## 2020-12-04 RX ORDER — FUROSEMIDE 20 MG/1
20 TABLET ORAL DAILY
Qty: 90 TABLET | Refills: 0 | Status: SHIPPED
Start: 2020-12-04 | End: 2021-04-09 | Stop reason: SDUPTHER

## 2020-12-04 RX ORDER — FUROSEMIDE 40 MG/1
40 TABLET ORAL DAILY
Qty: 90 TABLET | Refills: 0 | Status: SHIPPED
Start: 2020-12-04 | End: 2021-04-09 | Stop reason: SDUPTHER

## 2020-12-04 NOTE — TELEPHONE ENCOUNTER
Patient states you had him taking 4 of the lisinopril to equal 20 mg, so I changed it to a 20 mg tablet.

## 2020-12-22 RX ORDER — LISINOPRIL 20 MG/1
20 TABLET ORAL DAILY
Qty: 90 TABLET | Refills: 0 | Status: SHIPPED
Start: 2020-12-22 | End: 2021-04-09 | Stop reason: SDUPTHER

## 2021-04-09 ENCOUNTER — OFFICE VISIT (OUTPATIENT)
Dept: FAMILY MEDICINE CLINIC | Age: 31
End: 2021-04-09

## 2021-04-09 VITALS
HEIGHT: 74 IN | RESPIRATION RATE: 20 BRPM | HEART RATE: 96 BPM | OXYGEN SATURATION: 93 % | DIASTOLIC BLOOD PRESSURE: 74 MMHG | SYSTOLIC BLOOD PRESSURE: 134 MMHG | TEMPERATURE: 96.5 F | BODY MASS INDEX: 40.43 KG/M2 | WEIGHT: 315 LBS

## 2021-04-09 DIAGNOSIS — L03.115 CELLULITIS OF RIGHT LOWER LEG: Primary | ICD-10-CM

## 2021-04-09 DIAGNOSIS — B37.0 ORAL THRUSH: ICD-10-CM

## 2021-04-09 DIAGNOSIS — I50.40 SYSTOLIC AND DIASTOLIC CHF W/REDUCED LV FUNCTION, NYHA CLASS 4 (HCC): ICD-10-CM

## 2021-04-09 LAB
ALBUMIN SERPL-MCNC: 3.6 G/DL (ref 3.5–5.2)
ALP BLD-CCNC: 63 U/L (ref 40–129)
ALT SERPL-CCNC: 38 U/L (ref 0–40)
ANION GAP SERPL CALCULATED.3IONS-SCNC: 18 MMOL/L (ref 7–16)
AST SERPL-CCNC: 37 U/L (ref 0–39)
BASOPHILS ABSOLUTE: 0.05 E9/L (ref 0–0.2)
BASOPHILS RELATIVE PERCENT: 0.4 % (ref 0–2)
BILIRUB SERPL-MCNC: 0.3 MG/DL (ref 0–1.2)
BUN BLDV-MCNC: 14 MG/DL (ref 6–20)
CALCIUM SERPL-MCNC: 9.2 MG/DL (ref 8.6–10.2)
CHLORIDE BLD-SCNC: 105 MMOL/L (ref 98–107)
CHOLESTEROL, TOTAL: 110 MG/DL (ref 0–199)
CO2: 18 MMOL/L (ref 22–29)
CREAT SERPL-MCNC: 0.8 MG/DL (ref 0.7–1.2)
EOSINOPHILS ABSOLUTE: 0.08 E9/L (ref 0.05–0.5)
EOSINOPHILS RELATIVE PERCENT: 0.6 % (ref 0–6)
GFR AFRICAN AMERICAN: >60
GFR NON-AFRICAN AMERICAN: >60 ML/MIN/1.73
GLUCOSE BLD-MCNC: 90 MG/DL (ref 74–99)
HCT VFR BLD CALC: 44.6 % (ref 37–54)
HDLC SERPL-MCNC: 40 MG/DL
HEMOGLOBIN: 13.8 G/DL (ref 12.5–16.5)
IMMATURE GRANULOCYTES #: 0.06 E9/L
IMMATURE GRANULOCYTES %: 0.4 % (ref 0–5)
LDL CHOLESTEROL CALCULATED: 49 MG/DL (ref 0–99)
LYMPHOCYTES ABSOLUTE: 1.94 E9/L (ref 1.5–4)
LYMPHOCYTES RELATIVE PERCENT: 14.4 % (ref 20–42)
MCH RBC QN AUTO: 30.6 PG (ref 26–35)
MCHC RBC AUTO-ENTMCNC: 30.9 % (ref 32–34.5)
MCV RBC AUTO: 98.9 FL (ref 80–99.9)
MONOCYTES ABSOLUTE: 1.37 E9/L (ref 0.1–0.95)
MONOCYTES RELATIVE PERCENT: 10.2 % (ref 2–12)
NEUTROPHILS ABSOLUTE: 9.99 E9/L (ref 1.8–7.3)
NEUTROPHILS RELATIVE PERCENT: 74 % (ref 43–80)
PDW BLD-RTO: 13.7 FL (ref 11.5–15)
PLATELET # BLD: 244 E9/L (ref 130–450)
PMV BLD AUTO: 10.8 FL (ref 7–12)
POTASSIUM SERPL-SCNC: 4.1 MMOL/L (ref 3.5–5)
RBC # BLD: 4.51 E12/L (ref 3.8–5.8)
SODIUM BLD-SCNC: 141 MMOL/L (ref 132–146)
TOTAL PROTEIN: 7.7 G/DL (ref 6.4–8.3)
TRIGL SERPL-MCNC: 104 MG/DL (ref 0–149)
TSH SERPL DL<=0.05 MIU/L-ACNC: 1.88 UIU/ML (ref 0.27–4.2)
VLDLC SERPL CALC-MCNC: 21 MG/DL
WBC # BLD: 13.5 E9/L (ref 4.5–11.5)

## 2021-04-09 PROCEDURE — 99214 OFFICE O/P EST MOD 30 MIN: CPT | Performed by: FAMILY MEDICINE

## 2021-04-09 RX ORDER — LISINOPRIL 20 MG/1
20 TABLET ORAL DAILY
Qty: 90 TABLET | Refills: 0 | Status: SHIPPED
Start: 2021-04-09 | End: 2021-10-29 | Stop reason: SDUPTHER

## 2021-04-09 RX ORDER — FUROSEMIDE 20 MG/1
20 TABLET ORAL DAILY
Qty: 90 TABLET | Refills: 0 | Status: ON HOLD
Start: 2021-04-09 | End: 2021-04-19 | Stop reason: HOSPADM

## 2021-04-09 RX ORDER — FUROSEMIDE 40 MG/1
40 TABLET ORAL DAILY
Qty: 90 TABLET | Refills: 0 | Status: SHIPPED
Start: 2021-04-09 | End: 2021-10-29 | Stop reason: SDUPTHER

## 2021-04-09 RX ORDER — DOXYCYCLINE HYCLATE 100 MG
100 TABLET ORAL 2 TIMES DAILY
Qty: 20 TABLET | Refills: 0 | Status: ON HOLD
Start: 2021-04-09 | End: 2021-04-19 | Stop reason: HOSPADM

## 2021-04-09 RX ORDER — METOPROLOL SUCCINATE 25 MG/1
25 TABLET, EXTENDED RELEASE ORAL DAILY
Qty: 90 TABLET | Refills: 0 | Status: SHIPPED
Start: 2021-04-09 | End: 2021-10-29 | Stop reason: SDUPTHER

## 2021-04-09 ASSESSMENT — PATIENT HEALTH QUESTIONNAIRE - PHQ9: SUM OF ALL RESPONSES TO PHQ QUESTIONS 1-9: 0

## 2021-04-09 NOTE — PROGRESS NOTES
Subjective:  31 y.o. male who presents to the office today with chief complaint:  Chief Complaint   Patient presents with    Check-Up    Medication Refill     HTN: Current regimen includes lisinopril 20 mg daily along with his CHF meds that include Toprol-XL 25 mg daily and Lasix 60 mg daily. States that blood pressure has been running in normal ranges at home. Denies symptoms of hypertension or hypotension. CHF: Patient states when he is off of his diet, he noticed that he gains 10-15 pounds over a period of several days. He has been taking his Toprol-XL and Lasix as prescribed. Denies any chest pain, dyspnea on exertion, lower extremity edema. Leg wound: Occurred over the weekend while in [facility] [place]. Patient states that he hit his right anterior shin on a piece of concrete. Since then, the area has become erythematous and warm. Patient has used Neosporin and dressed the wound but nothing else. White tongue: Ongoing for a week. Has never had this in the past.  No pain in his mouth or difficulty with swallowing. Has not attempted anything for it. Health Maintenance Due   Topic Date Due    Hepatitis C screen  Never done    Varicella vaccine (1 of 2 - 2-dose childhood series) Never done    Pneumococcal 0-64 years Vaccine (1 of 1 - PPSV23) Never done    HIV screen  Never done    COVID-19 Vaccine (1) Never done    Hepatitis B vaccine (1 of 3 - Risk 3-dose series) Never done    Potassium monitoring  04/10/2021    Creatinine monitoring  04/10/2021     Past Medical History: CHF, hypertension, COPD, morbid obesity    Cancer History: None  Family Cancer History: None. Review of Systems    Review of Systems: All bolded are positive, all others are negative. General:  Fever, chills, diaphoresis, fatigue, malaise, night sweats, weight loss  Psychological:  Anxiety, disorientation, hallucinations. ENT:  Epistaxis, headaches, vertigo, visual changes.   Cardiovascular:  Chest pain, irregular heartbeats, palpitations, paroxysmal nocturnal dyspnea. Respiratory:  Shortness of breath, coughing, sputum production, hemoptysis, wheezing, orthopnea. Gastrointestinal:  Nausea, vomiting, diarrhea, heartburn, constipation, abdominal pain, hematemesis, hematochezia, melena, acholic stools  Genito-Urinary:  Dysuria, urgency, frequency, hematuria  Musculoskeletal:  Joint pain, joint stiffness, joint swelling, muscle pain  Neurology:  Headache, focal neurological deficits, weakness, numbness, paresthesia  Derm:  Rashes, ulcers, excoriations, bruising  Extremities:  Decreased ROM, peripheral edema, mottling      Objective:  Vitals:    04/09/21 0822   BP: 134/74   Pulse: 96   Resp: 20   Temp: 96.5 °F (35.8 °C)   SpO2: 93%     Physical Exam  Constitutional:       General: He is not in acute distress. Appearance: Normal appearance. He is well-developed. He is obese. He is not ill-appearing, toxic-appearing or diaphoretic. HENT:      Head: Normocephalic and atraumatic. Right Ear: External ear normal.      Left Ear: External ear normal.      Nose: Nose normal.      Mouth/Throat:      Pharynx: No oropharyngeal exudate. Comments: Tongue with white plaque covering. Eyes:      General:         Right eye: No discharge. Left eye: No discharge. Conjunctiva/sclera: Conjunctivae normal.      Pupils: Pupils are equal, round, and reactive to light. Neck:      Musculoskeletal: Normal range of motion and neck supple. Cardiovascular:      Rate and Rhythm: Normal rate and regular rhythm. Pulses: Normal pulses. Heart sounds: Normal heart sounds. No murmur. No friction rub. No gallop. Comments: Heart sounds distant due to body habitus. Pulmonary:      Effort: Pulmonary effort is normal. No respiratory distress. Breath sounds: No wheezing or rales. Comments: Breath sounds diminished due to body habitus. Abdominal:      General: Bowel sounds are normal. There is no distension. Palpations: Abdomen is soft. Tenderness: There is no abdominal tenderness. There is no guarding or rebound. Comments: Large pannus   Lymphadenopathy:      Cervical: No cervical adenopathy. Skin:     Comments: Erythematous area on right shin that is roughly 10 cm in diameter without drainage or bleeding. Neurological:      Mental Status: He is alert and oriented to person, place, and time. Psychiatric:         Behavior: Behavior normal.         Thought Content:  Thought content normal.         Judgment: Judgment normal.         Results for orders placed or performed during the hospital encounter of 04/10/20   HEMOGLOBIN A1C   Result Value Ref Range    Hemoglobin A1C 5.4 4.0 - 5.6 %   CBC Auto Differential   Result Value Ref Range    WBC 9.8 4.5 - 11.5 E9/L    RBC 5.03 3.80 - 5.80 E12/L    Hemoglobin 15.0 12.5 - 16.5 g/dL    Hematocrit 47.0 37.0 - 54.0 %    MCV 93.4 80.0 - 99.9 fL    MCH 29.8 26.0 - 35.0 pg    MCHC 31.9 (L) 32.0 - 34.5 %    RDW 14.1 11.5 - 15.0 fL    Platelets 903 141 - 570 E9/L    MPV 10.6 7.0 - 12.0 fL    Neutrophils % 60.0 43.0 - 80.0 %    Immature Granulocytes % 0.2 0.0 - 5.0 %    Lymphocytes % 31.8 20.0 - 42.0 %    Monocytes % 6.5 2.0 - 12.0 %    Eosinophils % 0.7 0.0 - 6.0 %    Basophils % 0.8 0.0 - 2.0 %    Neutrophils Absolute 5.85 1.80 - 7.30 E9/L    Immature Granulocytes # 0.02 E9/L    Lymphocytes Absolute 3.10 1.50 - 4.00 E9/L    Monocytes Absolute 0.63 0.10 - 0.95 E9/L    Eosinophils Absolute 0.07 0.05 - 0.50 E9/L    Basophils Absolute 0.08 0.00 - 0.20 E9/L   MAGNESIUM   Result Value Ref Range    Magnesium 2.2 1.6 - 2.6 mg/dL   Phosphorus   Result Value Ref Range    Phosphorus 3.2 2.5 - 4.5 mg/dL   Comprehensive Metabolic Panel, Fasting   Result Value Ref Range    Sodium 137 132 - 146 mmol/L    Potassium 4.6 3.5 - 5.0 mmol/L    Chloride 102 98 - 107 mmol/L    CO2 24 22 - 29 mmol/L    Anion Gap 11 7 - 16 mmol/L    Glucose, Fasting 90 74 - 99 mg/dL    BUN 10 6 - 20 mg/dL CREATININE 0.8 0.7 - 1.2 mg/dL    GFR Non-African American >60 >=60 mL/min/1.73    GFR African American >60     Calcium 9.6 8.6 - 10.2 mg/dL    Total Protein 7.8 6.4 - 8.3 g/dL    Albumin 4.2 3.5 - 5.2 g/dL    Total Bilirubin 0.5 0.0 - 1.2 mg/dL    Alkaline Phosphatase 60 40 - 129 U/L    ALT 33 0 - 40 U/L    AST 27 0 - 39 U/L   Lipid, Fasting   Result Value Ref Range    Cholesterol, Fasting 118 0 - 199 mg/dL    Triglyceride, Fasting 81 0 - 149 mg/dL    HDL 41 >40 mg/dL    LDL Calculated 61 0 - 99 mg/dL    VLDL Cholesterol Calculated 16 mg/dL         Assessment and Plan:  Yousuf Hamilton was seen today for check-up and medication refill. Diagnoses and all orders for this visit:    Systolic and diastolic CHF w/reduced LV function, NYHA class 4 (HCC)      HTN: Well-controlled on current regimen. Continue. CHF: Patient will take an extra 20 mg of Lasix over the next 4 days and then return to normal dosage. Otherwise no issues at this time. Continue current regimen. Leg wound: Doxycycline 100 mg twice daily for 10 days. White tongue: Nystatin swish and swallow twice daily for 14 days. Patient urged to follow-up with his dentist for regular dental care. Follow-up in 6 months    Patient may come in sooner if needed for medical concerns. Patient advised to call at any time to cancel or re-scheduleor for any questions/concerns. Please note that >15 minutes was spent face-to-face with the patient gathering history, performing physical exam, discussing findings, counseling the patient, and determining planforward. All questions and concerns addressed and answered.          Martha Baltazar DO   4/9/21    8:36 AM

## 2021-04-16 ENCOUNTER — APPOINTMENT (OUTPATIENT)
Dept: GENERAL RADIOLOGY | Age: 31
DRG: 603 | End: 2021-04-16

## 2021-04-16 ENCOUNTER — HOSPITAL ENCOUNTER (INPATIENT)
Age: 31
LOS: 3 days | Discharge: HOME | DRG: 603 | End: 2021-04-19
Attending: EMERGENCY MEDICINE | Admitting: INTERNAL MEDICINE

## 2021-04-16 ENCOUNTER — APPOINTMENT (OUTPATIENT)
Dept: ULTRASOUND IMAGING | Age: 31
DRG: 603 | End: 2021-04-16

## 2021-04-16 DIAGNOSIS — L02.415 CELLULITIS AND ABSCESS OF RIGHT LOWER EXTREMITY: Primary | ICD-10-CM

## 2021-04-16 DIAGNOSIS — L03.115 CELLULITIS AND ABSCESS OF RIGHT LOWER EXTREMITY: Primary | ICD-10-CM

## 2021-04-16 DIAGNOSIS — E66.01 CLASS 3 SEVERE OBESITY WITH BODY MASS INDEX (BMI) OF 45.0 TO 49.9 IN ADULT, UNSPECIFIED OBESITY TYPE, UNSPECIFIED WHETHER SERIOUS COMORBIDITY PRESENT (HCC): ICD-10-CM

## 2021-04-16 LAB
ALBUMIN SERPL-MCNC: 3.1 G/DL (ref 3.5–5.2)
ALP BLD-CCNC: 63 U/L (ref 40–129)
ALT SERPL-CCNC: 38 U/L (ref 0–40)
ANION GAP SERPL CALCULATED.3IONS-SCNC: 13 MMOL/L (ref 7–16)
AST SERPL-CCNC: 23 U/L (ref 0–39)
BASOPHILS ABSOLUTE: 0.09 E9/L (ref 0–0.2)
BASOPHILS RELATIVE PERCENT: 0.6 % (ref 0–2)
BILIRUB SERPL-MCNC: 0.4 MG/DL (ref 0–1.2)
BUN BLDV-MCNC: 7 MG/DL (ref 6–20)
CALCIUM SERPL-MCNC: 9.5 MG/DL (ref 8.6–10.2)
CHLORIDE BLD-SCNC: 99 MMOL/L (ref 98–107)
CO2: 24 MMOL/L (ref 22–29)
CREAT SERPL-MCNC: 0.7 MG/DL (ref 0.7–1.2)
EOSINOPHILS ABSOLUTE: 0.13 E9/L (ref 0.05–0.5)
EOSINOPHILS RELATIVE PERCENT: 0.8 % (ref 0–6)
GFR AFRICAN AMERICAN: >60
GFR NON-AFRICAN AMERICAN: >60 ML/MIN/1.73
GLUCOSE BLD-MCNC: 81 MG/DL (ref 74–99)
HCT VFR BLD CALC: 43.6 % (ref 37–54)
HEMOGLOBIN: 14 G/DL (ref 12.5–16.5)
IMMATURE GRANULOCYTES #: 0.16 E9/L
IMMATURE GRANULOCYTES %: 1 % (ref 0–5)
LACTIC ACID: 0.9 MMOL/L (ref 0.5–2.2)
LYMPHOCYTES ABSOLUTE: 2.24 E9/L (ref 1.5–4)
LYMPHOCYTES RELATIVE PERCENT: 13.7 % (ref 20–42)
MCH RBC QN AUTO: 30.7 PG (ref 26–35)
MCHC RBC AUTO-ENTMCNC: 32.1 % (ref 32–34.5)
MCV RBC AUTO: 95.6 FL (ref 80–99.9)
MONOCYTES ABSOLUTE: 0.87 E9/L (ref 0.1–0.95)
MONOCYTES RELATIVE PERCENT: 5.3 % (ref 2–12)
NEUTROPHILS ABSOLUTE: 12.85 E9/L (ref 1.8–7.3)
NEUTROPHILS RELATIVE PERCENT: 78.6 % (ref 43–80)
PDW BLD-RTO: 13.4 FL (ref 11.5–15)
PLATELET # BLD: 427 E9/L (ref 130–450)
PMV BLD AUTO: 9.8 FL (ref 7–12)
POTASSIUM REFLEX MAGNESIUM: 3.8 MMOL/L (ref 3.5–5)
PRO-BNP: 31 PG/ML (ref 0–125)
RBC # BLD: 4.56 E12/L (ref 3.8–5.8)
SODIUM BLD-SCNC: 136 MMOL/L (ref 132–146)
TOTAL PROTEIN: 8.7 G/DL (ref 6.4–8.3)
TROPONIN: <0.01 NG/ML (ref 0–0.03)
WBC # BLD: 16.3 E9/L (ref 4.5–11.5)

## 2021-04-16 PROCEDURE — 85025 COMPLETE CBC W/AUTO DIFF WBC: CPT

## 2021-04-16 PROCEDURE — 6360000002 HC RX W HCPCS: Performed by: PHYSICIAN ASSISTANT

## 2021-04-16 PROCEDURE — 1200000000 HC SEMI PRIVATE

## 2021-04-16 PROCEDURE — 83605 ASSAY OF LACTIC ACID: CPT

## 2021-04-16 PROCEDURE — 87075 CULTR BACTERIA EXCEPT BLOOD: CPT

## 2021-04-16 PROCEDURE — 84484 ASSAY OF TROPONIN QUANT: CPT

## 2021-04-16 PROCEDURE — 87070 CULTURE OTHR SPECIMN AEROBIC: CPT

## 2021-04-16 PROCEDURE — 71046 X-RAY EXAM CHEST 2 VIEWS: CPT

## 2021-04-16 PROCEDURE — 2580000003 HC RX 258: Performed by: PHYSICIAN ASSISTANT

## 2021-04-16 PROCEDURE — 83880 ASSAY OF NATRIURETIC PEPTIDE: CPT

## 2021-04-16 PROCEDURE — 80053 COMPREHEN METABOLIC PANEL: CPT

## 2021-04-16 PROCEDURE — 93971 EXTREMITY STUDY: CPT

## 2021-04-16 PROCEDURE — 87040 BLOOD CULTURE FOR BACTERIA: CPT

## 2021-04-16 PROCEDURE — 36415 COLL VENOUS BLD VENIPUNCTURE: CPT

## 2021-04-16 PROCEDURE — 87186 SC STD MICRODIL/AGAR DIL: CPT

## 2021-04-16 PROCEDURE — 99283 EMERGENCY DEPT VISIT LOW MDM: CPT

## 2021-04-16 PROCEDURE — 87147 CULTURE TYPE IMMUNOLOGIC: CPT

## 2021-04-16 PROCEDURE — 93005 ELECTROCARDIOGRAM TRACING: CPT | Performed by: PHYSICIAN ASSISTANT

## 2021-04-16 RX ORDER — 0.9 % SODIUM CHLORIDE 0.9 %
1000 INTRAVENOUS SOLUTION INTRAVENOUS ONCE
Status: COMPLETED | OUTPATIENT
Start: 2021-04-16 | End: 2021-04-17

## 2021-04-16 RX ADMIN — SODIUM CHLORIDE 2000 MG: 9 INJECTION INTRAMUSCULAR; INTRAVENOUS; SUBCUTANEOUS at 21:31

## 2021-04-16 RX ADMIN — VANCOMYCIN HYDROCHLORIDE 2000 MG: 10 INJECTION, POWDER, LYOPHILIZED, FOR SOLUTION INTRAVENOUS at 21:32

## 2021-04-16 RX ADMIN — SODIUM CHLORIDE 1000 ML: 9 INJECTION, SOLUTION INTRAVENOUS at 21:33

## 2021-04-17 LAB
ALBUMIN SERPL-MCNC: 2.9 G/DL (ref 3.5–5.2)
ALP BLD-CCNC: 55 U/L (ref 40–129)
ALT SERPL-CCNC: 32 U/L (ref 0–40)
ANION GAP SERPL CALCULATED.3IONS-SCNC: 8 MMOL/L (ref 7–16)
ANTISTREPTOLYSIN-O: 202 IU/ML (ref 0–200)
AST SERPL-CCNC: 21 U/L (ref 0–39)
BASOPHILS ABSOLUTE: 0.09 E9/L (ref 0–0.2)
BASOPHILS RELATIVE PERCENT: 0.6 % (ref 0–2)
BILIRUB SERPL-MCNC: 0.2 MG/DL (ref 0–1.2)
BUN BLDV-MCNC: 7 MG/DL (ref 6–20)
CALCIUM SERPL-MCNC: 8.8 MG/DL (ref 8.6–10.2)
CHLORIDE BLD-SCNC: 105 MMOL/L (ref 98–107)
CHOLESTEROL, TOTAL: 81 MG/DL (ref 0–199)
CO2: 27 MMOL/L (ref 22–29)
CREAT SERPL-MCNC: 0.7 MG/DL (ref 0.7–1.2)
EKG ATRIAL RATE: 98 BPM
EKG P AXIS: 67 DEGREES
EKG P-R INTERVAL: 136 MS
EKG Q-T INTERVAL: 360 MS
EKG QRS DURATION: 90 MS
EKG QTC CALCULATION (BAZETT): 459 MS
EKG R AXIS: -8 DEGREES
EKG T AXIS: 18 DEGREES
EKG VENTRICULAR RATE: 98 BPM
EOSINOPHILS ABSOLUTE: 0.23 E9/L (ref 0.05–0.5)
EOSINOPHILS RELATIVE PERCENT: 1.6 % (ref 0–6)
GFR AFRICAN AMERICAN: >60
GFR NON-AFRICAN AMERICAN: >60 ML/MIN/1.73
GLUCOSE BLD-MCNC: 112 MG/DL (ref 74–99)
HBA1C MFR BLD: 5.7 % (ref 4–5.6)
HCT VFR BLD CALC: 37.2 % (ref 37–54)
HDLC SERPL-MCNC: 23 MG/DL
HEMOGLOBIN: 11.9 G/DL (ref 12.5–16.5)
IMMATURE GRANULOCYTES #: 0.12 E9/L
IMMATURE GRANULOCYTES %: 0.9 % (ref 0–5)
LDL CHOLESTEROL CALCULATED: 42 MG/DL (ref 0–99)
LYMPHOCYTES ABSOLUTE: 2.8 E9/L (ref 1.5–4)
LYMPHOCYTES RELATIVE PERCENT: 19.9 % (ref 20–42)
MCH RBC QN AUTO: 30.7 PG (ref 26–35)
MCHC RBC AUTO-ENTMCNC: 32 % (ref 32–34.5)
MCV RBC AUTO: 96.1 FL (ref 80–99.9)
MONOCYTES ABSOLUTE: 1.12 E9/L (ref 0.1–0.95)
MONOCYTES RELATIVE PERCENT: 8 % (ref 2–12)
NEUTROPHILS ABSOLUTE: 9.69 E9/L (ref 1.8–7.3)
NEUTROPHILS RELATIVE PERCENT: 69 % (ref 43–80)
PDW BLD-RTO: 13.5 FL (ref 11.5–15)
PLATELET # BLD: 380 E9/L (ref 130–450)
PMV BLD AUTO: 9.9 FL (ref 7–12)
POTASSIUM SERPL-SCNC: 3.6 MMOL/L (ref 3.5–5)
RBC # BLD: 3.87 E12/L (ref 3.8–5.8)
SEDIMENTATION RATE, ERYTHROCYTE: 105 MM/HR (ref 0–15)
SODIUM BLD-SCNC: 140 MMOL/L (ref 132–146)
TOTAL PROTEIN: 7.2 G/DL (ref 6.4–8.3)
TRIGL SERPL-MCNC: 81 MG/DL (ref 0–149)
TSH SERPL DL<=0.05 MIU/L-ACNC: 1.11 UIU/ML (ref 0.27–4.2)
VLDLC SERPL CALC-MCNC: 16 MG/DL
WBC # BLD: 14.1 E9/L (ref 4.5–11.5)

## 2021-04-17 PROCEDURE — 87206 SMEAR FLUORESCENT/ACID STAI: CPT

## 2021-04-17 PROCEDURE — 87088 URINE BACTERIA CULTURE: CPT

## 2021-04-17 PROCEDURE — 1200000000 HC SEMI PRIVATE

## 2021-04-17 PROCEDURE — 80053 COMPREHEN METABOLIC PANEL: CPT

## 2021-04-17 PROCEDURE — 84443 ASSAY THYROID STIM HORMONE: CPT

## 2021-04-17 PROCEDURE — 36415 COLL VENOUS BLD VENIPUNCTURE: CPT

## 2021-04-17 PROCEDURE — 6360000002 HC RX W HCPCS: Performed by: INTERNAL MEDICINE

## 2021-04-17 PROCEDURE — 87147 CULTURE TYPE IMMUNOLOGIC: CPT

## 2021-04-17 PROCEDURE — 83036 HEMOGLOBIN GLYCOSYLATED A1C: CPT

## 2021-04-17 PROCEDURE — 87116 MYCOBACTERIA CULTURE: CPT

## 2021-04-17 PROCEDURE — 87591 N.GONORRHOEAE DNA AMP PROB: CPT

## 2021-04-17 PROCEDURE — 87070 CULTURE OTHR SPECIMN AEROBIC: CPT

## 2021-04-17 PROCEDURE — 85651 RBC SED RATE NONAUTOMATED: CPT

## 2021-04-17 PROCEDURE — 85025 COMPLETE CBC W/AUTO DIFF WBC: CPT

## 2021-04-17 PROCEDURE — 93010 ELECTROCARDIOGRAM REPORT: CPT | Performed by: INTERNAL MEDICINE

## 2021-04-17 PROCEDURE — 87015 SPECIMEN INFECT AGNT CONCNTJ: CPT

## 2021-04-17 PROCEDURE — 86060 ANTISTREPTOLYSIN O TITER: CPT

## 2021-04-17 PROCEDURE — 87491 CHLMYD TRACH DNA AMP PROBE: CPT

## 2021-04-17 PROCEDURE — 2580000003 HC RX 258: Performed by: INTERNAL MEDICINE

## 2021-04-17 PROCEDURE — 6370000000 HC RX 637 (ALT 250 FOR IP): Performed by: INTERNAL MEDICINE

## 2021-04-17 PROCEDURE — 80061 LIPID PANEL: CPT

## 2021-04-17 RX ORDER — METOPROLOL SUCCINATE 25 MG/1
25 TABLET, EXTENDED RELEASE ORAL DAILY
Status: DISCONTINUED | OUTPATIENT
Start: 2021-04-17 | End: 2021-04-19 | Stop reason: HOSPADM

## 2021-04-17 RX ORDER — LISINOPRIL 10 MG/1
20 TABLET ORAL DAILY
Status: DISCONTINUED | OUTPATIENT
Start: 2021-04-17 | End: 2021-04-19 | Stop reason: HOSPADM

## 2021-04-17 RX ORDER — FUROSEMIDE 40 MG/1
40 TABLET ORAL DAILY
Status: DISCONTINUED | OUTPATIENT
Start: 2021-04-17 | End: 2021-04-19 | Stop reason: HOSPADM

## 2021-04-17 RX ORDER — OXYCODONE HYDROCHLORIDE AND ACETAMINOPHEN 5; 325 MG/1; MG/1
1 TABLET ORAL EVERY 4 HOURS PRN
Status: DISCONTINUED | OUTPATIENT
Start: 2021-04-17 | End: 2021-04-19 | Stop reason: HOSPADM

## 2021-04-17 RX ORDER — DOXYCYCLINE HYCLATE 100 MG/1
100 CAPSULE ORAL 2 TIMES DAILY
Status: DISCONTINUED | OUTPATIENT
Start: 2021-04-17 | End: 2021-04-17

## 2021-04-17 RX ORDER — ACETAMINOPHEN 325 MG/1
650 TABLET ORAL EVERY 4 HOURS PRN
Status: DISCONTINUED | OUTPATIENT
Start: 2021-04-17 | End: 2021-04-19 | Stop reason: HOSPADM

## 2021-04-17 RX ADMIN — NYSTATIN 500000 UNITS: 100000 SUSPENSION ORAL at 08:53

## 2021-04-17 RX ADMIN — VANCOMYCIN HYDROCHLORIDE 1000 MG: 1 INJECTION, POWDER, LYOPHILIZED, FOR SOLUTION INTRAVENOUS at 20:48

## 2021-04-17 RX ADMIN — CEFTRIAXONE 2000 MG: 2 INJECTION, POWDER, FOR SOLUTION INTRAMUSCULAR; INTRAVENOUS at 20:45

## 2021-04-17 RX ADMIN — OXYCODONE AND ACETAMINOPHEN 1 TABLET: 5; 325 TABLET ORAL at 17:51

## 2021-04-17 RX ADMIN — LISINOPRIL 20 MG: 10 TABLET ORAL at 08:53

## 2021-04-17 RX ADMIN — DOXYCYCLINE HYCLATE 100 MG: 100 CAPSULE ORAL at 08:53

## 2021-04-17 RX ADMIN — OXYCODONE AND ACETAMINOPHEN 1 TABLET: 5; 325 TABLET ORAL at 21:46

## 2021-04-17 RX ADMIN — METOPROLOL SUCCINATE 25 MG: 25 TABLET, EXTENDED RELEASE ORAL at 08:53

## 2021-04-17 RX ADMIN — VANCOMYCIN HYDROCHLORIDE 1000 MG: 1 INJECTION, POWDER, LYOPHILIZED, FOR SOLUTION INTRAVENOUS at 08:55

## 2021-04-17 RX ADMIN — NYSTATIN 500000 UNITS: 100000 SUSPENSION ORAL at 17:16

## 2021-04-17 RX ADMIN — NYSTATIN 500000 UNITS: 100000 SUSPENSION ORAL at 20:46

## 2021-04-17 RX ADMIN — OXYCODONE AND ACETAMINOPHEN 1 TABLET: 5; 325 TABLET ORAL at 12:08

## 2021-04-17 RX ADMIN — OXYCODONE AND ACETAMINOPHEN 1 TABLET: 5; 325 TABLET ORAL at 06:16

## 2021-04-17 RX ADMIN — ENOXAPARIN SODIUM 40 MG: 40 INJECTION SUBCUTANEOUS at 08:54

## 2021-04-17 RX ADMIN — FUROSEMIDE 40 MG: 40 TABLET ORAL at 08:53

## 2021-04-17 SDOH — ECONOMIC STABILITY: FOOD INSECURITY: WITHIN THE PAST 12 MONTHS, THE FOOD YOU BOUGHT JUST DIDN'T LAST AND YOU DIDN'T HAVE MONEY TO GET MORE.: NOT ASKED

## 2021-04-17 SDOH — ECONOMIC STABILITY: INCOME INSECURITY: HOW HARD IS IT FOR YOU TO PAY FOR THE VERY BASICS LIKE FOOD, HOUSING, MEDICAL CARE, AND HEATING?: NOT ASKED

## 2021-04-17 SDOH — HEALTH STABILITY: MENTAL HEALTH: HOW OFTEN DO YOU HAVE A DRINK CONTAINING ALCOHOL?: 2-4 TIMES A MONTH

## 2021-04-17 ASSESSMENT — PAIN DESCRIPTION - PAIN TYPE
TYPE: ACUTE PAIN

## 2021-04-17 ASSESSMENT — PAIN SCALES - GENERAL
PAINLEVEL_OUTOF10: 8
PAINLEVEL_OUTOF10: 6
PAINLEVEL_OUTOF10: 5
PAINLEVEL_OUTOF10: 8

## 2021-04-17 ASSESSMENT — PAIN DESCRIPTION - ONSET: ONSET: GRADUAL

## 2021-04-17 ASSESSMENT — PAIN DESCRIPTION - LOCATION
LOCATION: LEG

## 2021-04-17 ASSESSMENT — PAIN DESCRIPTION - ORIENTATION: ORIENTATION: RIGHT

## 2021-04-17 ASSESSMENT — PAIN DESCRIPTION - FREQUENCY: FREQUENCY: CONTINUOUS

## 2021-04-17 NOTE — ED PROVIDER NOTES
ED Attending  CC: No                                                                                                                                      Department of Emergency Medicine   ED  Provider Note  Admit Date/RoomTime: 4/16/2021  8:14 PM  ED Room: 22/22        HPI:  4/16/21,   Time: 8:01 PM EDT         Reyna Anna is a 31 y.o. male presenting to the ED for right leg redness and swelling, beginning over a week ago. The complaint has been persistent, moderate in severity, and worsened by walking. The patient states he was in [facility] over a week ago and bumped his right lower leg on something. States he noticed swelling at the time but there was no open wounds or drainage initially. States few days later he started seeing some redness and more swelling at site. Pt was seen by his PCP and started on Doxycycline. Pt has been taking meds as directed but swelling, redness and drainage getting worse. Pt reports night sweats initially. No history of DVT or PE. Denies CP or SOB. Sent to ED because swelling and redness is getting worse.   Pt is not a diabetic but has hx of CHF.           ROS:     Constitutional: See HPI  HENT: Negative for ear pain, sore throat and sinus pressure  Eyes: Negative for pain, discharge and redness  Respiratory:  Negative for shortness of breath, cough and wheezing  Cardiovascular: Negative for CP, edema or palpitations  Gastrointestinal: Negative for nausea, vomiting, diarrhea and abdominal distention  Genitourinary: Negative for dysuria and frequency  Musculoskeletal: See HPI  Skin: See HPI  Neurological: Negative for weakness and headaches  Hematological: Negative for adenopathy    All other systems reviewed and are negative      -------------------------------- PAST HISTORY ----------------------------------  Past Medical History:  has a past medical history of COPD (chronic obstructive pulmonary disease) (HCC) and Systolic and diastolic CHF w/reduced LV function, NYHA class 4 ([facility] [address] Past Surgical History:  has a past surgical history that includes Tonsillectomy. Social History:  reports that he has never smoked. He has never used smokeless tobacco. He reports current alcohol use. He reports that he does not use drugs. Family History: family history includes Asthma in his maternal uncle; COPD in his paternal grandmother; Diabetes in his maternal grandmother; Heart Disease in his maternal grandmother. The patients home medications have been reviewed.     Allergies: Sulfa antibiotics    --------------------------------- RESULTS ------------------------------------------  All laboratory and radiology results have been personally reviewed by myself   LABS:  Results for orders placed or performed during the hospital encounter of 04/16/21   CBC Auto Differential   Result Value Ref Range    WBC 16.3 (H) 4.5 - 11.5 E9/L    RBC 4.56 3.80 - 5.80 E12/L    Hemoglobin 14.0 12.5 - 16.5 g/dL    Hematocrit 43.6 37.0 - 54.0 %    MCV 95.6 80.0 - 99.9 fL    MCH 30.7 26.0 - 35.0 pg    MCHC 32.1 32.0 - 34.5 %    RDW 13.4 11.5 - 15.0 fL    Platelets 574 418 - 435 E9/L    MPV 9.8 7.0 - 12.0 fL    Neutrophils % 78.6 43.0 - 80.0 %    Immature Granulocytes % 1.0 0.0 - 5.0 %    Lymphocytes % 13.7 (L) 20.0 - 42.0 %    Monocytes % 5.3 2.0 - 12.0 %    Eosinophils % 0.8 0.0 - 6.0 %    Basophils % 0.6 0.0 - 2.0 %    Neutrophils Absolute 12.85 (H) 1.80 - 7.30 E9/L    Immature Granulocytes # 0.16 E9/L    Lymphocytes Absolute 2.24 1.50 - 4.00 E9/L    Monocytes Absolute 0.87 0.10 - 0.95 E9/L    Eosinophils Absolute 0.13 0.05 - 0.50 E9/L    Basophils Absolute 0.09 0.00 - 0.20 E9/L   Comprehensive Metabolic Panel w/ Reflex to MG   Result Value Ref Range    Sodium 136 132 - 146 mmol/L    Potassium reflex Magnesium 3.8 3.5 - 5.0 mmol/L    Chloride 99 98 - 107 mmol/L    CO2 24 22 - 29 mmol/L    Anion Gap 13 7 - 16 mmol/L    Glucose 81 74 - 99 mg/dL    BUN 7 6 - 20 mg/dL    CREATININE 0.7 0.7 - 1.2 mg/dL GFR Non-African American >60 >=60 mL/min/1.73    GFR African American >60     Calcium 9.5 8.6 - 10.2 mg/dL    Total Protein 8.7 (H) 6.4 - 8.3 g/dL    Albumin 3.1 (L) 3.5 - 5.2 g/dL    Total Bilirubin 0.4 0.0 - 1.2 mg/dL    Alkaline Phosphatase 63 40 - 129 U/L    ALT 38 0 - 40 U/L    AST 23 0 - 39 U/L   Lactic Acid, Plasma   Result Value Ref Range    Lactic Acid 0.9 0.5 - 2.2 mmol/L   Brain Natriuretic Peptide   Result Value Ref Range    Pro-BNP 31 0 - 125 pg/mL   Troponin   Result Value Ref Range    Troponin <0.01 0.00 - 0.03 ng/mL   EKG 12 Lead   Result Value Ref Range    Ventricular Rate 98 BPM    Atrial Rate 98 BPM    P-R Interval 136 ms    QRS Duration 90 ms    Q-T Interval 360 ms    QTc Calculation (Bazett) 459 ms    P Axis 67 degrees    R Axis -8 degrees    T Axis 18 degrees       RADIOLOGY:  Interpreted by Radiologist.  XR CHEST (2 VW)   Final Result   Mild prominence of the cardiac silhouette and mild central pulmonary vascular   congestion. No additional cardiopulmonary pathology identified. US DUP LOWER EXTREMITY RIGHT ROMAN   Final Result   No evidence of DVT in the right lower extremity.             ----------------- NURSING NOTES AND VITALS REVIEWED ---------------   The nursing notes within the ED encounter and vital signs as below have been reviewed. /79   Pulse 87   Temp 97.3 °F (36.3 °C) (Oral)   Resp 18   Ht 6' 2\" (1.88 m)   Wt (!) 385 lb (174.6 kg)   SpO2 95%   BMI 49.43 kg/m²   Oxygen Saturation Interpretation: Normal      --------------------------------PHYSICAL EXAM------------------------------------      Constitutional/General: Alert and oriented x3, well appearing, non toxic in NAD  Head: NC/AT  Eyes: PERRL, EOMI  Mouth: Oropharynx clear, handling secretions, no trismus  Neck: Supple, full ROM, no meningeal signs  Pulmonary: Lungs clear to auscultation bilaterally, no wheezes, rales, or rhonchi.  Not in respiratory distress  Cardiovascular:  Regular rate and rhythm, no murmurs, gallops, or rubs. 2+ distal pulses  Abdomen: Soft, + BS. No distension. Nontender. No palpable rigidity, rebound or guarding  Extremities: Moves all extremities x 4. Pt has marked swelling, redness and increased warmth from right knee to ankle. He has small open draining wound mid shin. Diffusely tender to palpation. Warm and well perfused  Skin: See above  Neurologic: GCS 15,  Intact. No focal deficits  Psych: Normal Affect              ------------------------ ED COURSE/MEDICAL DECISION MAKING----------------------  Medications   0.9 % sodium chloride bolus (1,000 mLs Intravenous New Bag 4/16/21 2133)   vancomycin 2000 mg in dextrose 5% 500 ml IVPB (2,000 mg Intravenous New Bag 4/16/21 2132)   cefTRIAXone (ROCEPHIN) 2,000 mg in sodium chloride (PF) 20 mL IV syringe (2,000 mg Intravenous Given 4/16/21 2131)         Medical Decision Making:    Obvious infection in the right lower leg. Concern for possible abscess as well. The patient is not a diabetic. He has failed outpatient management with doxycycline. No DVT seen on ultrasound. His white count is greater than 16,000. He was given vancomycin and Rocephin here after blood cultures were sent. Wound cultures pending. Discussed admission with Dr. Karla Alvarez. He is agreeable to admit       Counseling: The emergency provider has spoken with the patient and discussed todays results, in addition to providing specific details for the plan of care and counseling regarding the diagnosis and prognosis. Questions are answered at this time and they are agreeable with the plan.      ------------------------ IMPRESSION AND DISPOSITION -------------------------------    IMPRESSION  1. Cellulitis and abscess of right lower extremity    2.  Class 3 severe obesity with body mass index (BMI) of 45.0 to 49.9 in adult, unspecified obesity type, unspecified whether serious comorbidity present ([facility]        DISPOSITION  Disposition: Admit to med/surg floor Patient condition is stable                   Brianda Hammond PA-C  04/16/21 [id number]

## 2021-04-17 NOTE — CONSULTS
[address] [facility]  Consult Note    [address] [facility] [address]  [place], [address]  Phone [phone]   [phone]      Admit Date: 2021  8:14 PM  Pt Name: Nanette Salguero  MRN: [id number]  : 1990  Reason for Consult:    Chief Complaint   Patient presents with    Cellulitis     Pt presents today for cellulitis on right leg. Pt states that he has been taking medication as prescribed by pcp last week. Antibiotic is doxycline. Requesting Physician:  Petar Deleon DO  PCP: Brian Jose DO  History Obtained From:  patient, chart   ID consulted for Cellulitis of right leg [F11.095]  on hospital day  59       Chief Complaint   Patient presents with    Cellulitis     Pt presents today for cellulitis on right leg. Pt states that he has been taking medication as prescribed by pcp last week. Antibiotic is doxycline. HISTORYOF PRESENT ILLNESS   Nanette Salguero is a 31 y.o. male who presents with significant past medical history of  has a past medical history of Class 3 severe obesity due to excess calories without serious comorbidity with body mass index (BMI) of 45.0 to 49.9 in [facility]), COPD (chronic obstructive pulmonary disease) ([facility] [address] THOM (obstructive sleep apnea), and Systolic and diastolic CHF w/reduced LV function, NYHA class 4 ([facility] [address] ED TRIAGEVITALS  BP: 135/75, Temp: 97.8 °F (36.6 °C), Pulse: 68, Resp: 18, SpO2: 96 %  HPI   PT DEVELOPED RIGHT LE WOUND  AND WHITE TONGUE while he was on [place]. He did go swimming after.  He  SAW his PCP on   HE WAS PLACED ON DOXY AND NYSTATIN S/S  He had fevers til few days ago   No n/v/d/other rash/itch joint aches  HE COMES IN ON  WITH RLE REDNESS/pain and open wound  WBC16.3 CR0.7  RLE NEG DVT    RECEIVED CEFTRIAXONE/VANCO  No steroids no prior atbx  Mother present and saw wound  REVIEW OF SYSTEMS       REVIEW OFSYSTEMS:    CONSTITUTIONAL:   No fever, chills, weight loss vancomycin (VANCOCIN) 1,000 mg in dextrose 5 % 250 mL IVPB, 1,000 mg, Intravenous, Q12H, Barbara Coates DO, Stopped at 04/17/21 0958    cefTRIAXone (ROCEPHIN) 2,000 mg in sterile water 20 mL IV syringe, 2,000 mg, Intravenous, Q24H, Karan Rodriguez DO  ALLERGIES     Sulfa antibiotics  Immunization History   Administered Date(s) Administered    MMR 09/11/2002    Tdap (Boostrix, Adacel) 06/03/2020     PAST MEDICAL HISTORY     Past Medical History:   Diagnosis Date    Class 3 severe obesity due to excess calories without serious comorbidity with body mass index (BMI) of 45.0 to 49.9 in adult [facility])     COPD (chronic obstructive pulmonary disease) (HCC)     THOM (obstructive sleep apnea)     Systolic and diastolic CHF w/reduced LV function, NYHA class 4 ([facility] [address]      SURGICAL HISTORY       Past Surgical History:   Procedure Laterality Date    TONSILLECTOMY       FAMILY HISTORY       Family History   Problem Relation Age of Onset    Heart Attack Father     Asthma Maternal Uncle     Diabetes Maternal Grandmother     Heart Disease Maternal Grandmother     COPD Paternal Grandmother      SOCIAL HISTORY       Social History     Socioeconomic History    Marital status: Single     Spouse name: None    Number of children: 0    Years of education: None    Highest education level: None   Occupational History     Employer: SELF EMPLOYED    Occupation: -Business owner   Social Needs    Financial resource strain: None    Food insecurity     Worry: None     Inability: None    Transportation needs     Medical: None     Non-medical: None   Tobacco Use    Smoking status: Never Smoker    Smokeless tobacco: Never Used   Substance and Sexual Activity    Alcohol use: Yes     Frequency: 2-4 times a month     Drinks per session: 1 or 2     Comment: occ    Drug use: No    Sexual activity: Not Currently   Lifestyle    Physical activity     Days per week: None     Minutes per session: None    Stress: None Relationships    Social connections     Talks on phone: None     Gets together: None     Attends Jew service: None     Active member of club or organization: None     Attends meetings of clubs or organizations: None     Relationship status: None    Intimate partner violence     Fear of current or ex partner: None     Emotionally abused: None     Physically abused: None     Forced sexual activity: None   Other Topics Concern    None   Social History Narrative    None   ·  was truckdriver    PHYSICAL EXAM       ED Triage Vitals [04/16/21 1551]   BP Temp Temp Source Pulse Resp SpO2 Height Weight   (!) 169/77 97.3 °F (36.3 °C) Oral 120 18 95 % 6' 2\" (1.88 m) (!) 385 lb (174.6 kg)     Vitals:    Vitals:    04/16/21 2332 04/17/21 0056 04/17/21 0612 04/17/21 0838   BP: 126/74 135/70 (!) 147/89 135/75   Pulse: 89 85 93 68   Resp: 16 16 16 18   Temp: 99.1 °F (37.3 °C)  99.4 °F (37.4 °C) 97.8 °F (36.6 °C)   TempSrc:   Oral Oral   SpO2: 97% 97% 95% 96%   Weight:       Height:         Physical Exam   Constitutional/General: Alert and oriented, NAD inc bmi   Head: NC/AT  Eyes: PERRL, EOMI  Mouth: Normal mucosa, no thrush   Neck: Supple, full ROM,    Pulmonary: Lungs clear to auscultation bilaterally. Not in respiratory distress  Cardiovascular:  Regular rate and rhythm, no murmurs, gallops, or rubs. Abdomen: Soft, + BS.    distension. Nontender. Extremities: Moves all extremities x 4.    Warm and well perfused right calf erythema ant tibia ulcer about 1cm with rounding of wound edges has serosang d/c  Pulses:  Distal pulses dec peddal edema  Skin: Warm and dry without rash  Neurologic:    No focal deficits  Psych: Normal Affect     DIAGNOSTIC RESULTS   RADIOLOGY:   Xr Chest (2 Vw)    Result Date: 4/16/2021  EXAMINATION: TWO XRAY VIEWS OF THE CHEST 4/16/2021 9:04 pm COMPARISON: Chest series from May 15, 2017 HISTORY: ORDERING SYSTEM PROVIDED HISTORY: LE edema/cellulitis TECHNOLOGIST PROVIDED HISTORY: Reason for exam:->LE edema/cellulitis FINDINGS: Adequate and symmetric aeration of the lungs. There are no formed consolidations, pleural effusions, or pneumothoraces. Trachea and central mainstem bronchi appear clear. Cardiac silhouette is mildly prominent. Mild central pulmonary vascular congestion osseous and thoracic soft tissue structures demonstrate no acute findings. Mild prominence of the cardiac silhouette and mild central pulmonary vascular congestion. No additional cardiopulmonary pathology identified. Us Dup Lower Extremity Right Ketan    Result Date: 4/16/2021  EXAMINATION: DUPLEX VENOUS ULTRASOUND OF THE RIGHT LOWER EXTREMITY, 4/16/2021 8:41 pm TECHNIQUE: Duplex ultrasound using B-mode/gray scaled imaging and Doppler spectral analysis and color flow was obtained of the right lower extremity. COMPARISON: None. HISTORY: ORDERING SYSTEM PROVIDED HISTORY: Discomfort TECHNOLOGIST PROVIDED HISTORY: Reason for exam:->Discomfort What reading provider will be dictating this exam?->CRC FINDINGS: The visualized veins of the right lower extremity are patent and free of echogenic thrombus. The veins demonstrate good compressibility with normal color flow study and spectral analysis. No evidence of DVT in the right lower extremity. LABS  Recent Labs     04/16/21  1900 04/17/21  0556   WBC 16.3* 14.1*   HGB 14.0 11.9*   HCT 43.6 37.2   MCV 95.6 96.1    380     Recent Labs     04/16/21  1900 04/17/21  0556    140   K 3.8 3.6   CL 99 105   CO2 24 27   BUN 7 7   CREATININE 0.7 0.7   GFRAA >60 >60   LABGLOM >60 >60   GLUCOSE 81 112*   PROT 8.7* 7.2   LABALBU 3.1* 2.9*   CALCIUM 9.5 8.8   BILITOT 0.4 0.2   ALKPHOS 63 55   AST 23 21   ALT 38 32     No results for input(s): PROCAL in the last 72 hours.   No results found for: CRP  Lab Results   Component Value Date    SEDRATE 105 (H) 04/17/2021     No results found for: [id number]  No results found for: COVID19  COVID-19/AV-COV2 LABS  Recent Labs 04/16/21  1900 04/17/21  0556   TROPONINI <0.01  --    AST 23 21   ALT 38 32   TRIG  --  81     Lab Results   Component Value Date    CHOL 81 04/17/2021    TRIG 81 04/17/2021    HDL 23 04/17/2021    LDLCALC 42 04/17/2021    LABVLDL 16 04/17/2021        MICROBIOLOGY:     Patient is a 31 y.o. male who presented with   Chief Complaint   Patient presents with    Cellulitis     Pt presents today for cellulitis on right leg. Pt states that he has been taking medication as prescribed by pcp last week. Antibiotic is doxycline. FINAL IMPRESSION    leukocytosis  1. Cellulitis and abscess of right lower extremity looks atypical traumatic wound   2. Class 3 severe obesity with body mass index (BMI) of 45.0 to 49.9 in adult, unspecified obesity type, unspecified whether serious comorbidity present (HCC)      -?thrush check for immunodeficency     vancomycin (VANCOCIN) 1,000 mg in dextrose 5 % 250 mL IVPB, Q12H  cefTRIAXone (ROCEPHIN) 2,000 mg in sterile water 20 mL IV syringe, Q24H      Orders Placed This Encounter   Procedures    Culture, Blood 1    Culture, Blood 2    Culture, Wound    Culture, Anaerobic    Culture, Wound    Culture with Smear, Acid Fast Bacillius    C.trachomatis N.gonorrhoeae DNA, Urine    Culture, Urine    Antistreptolysin O titer 202 qpt396    RPR    HIV Screen    Hepatitis C antibody      Suggest arterial studies     Imaging and labs were reviewed per medical records and any ID pertinent labs were addressed with the patient. The patient/FAMILY  was educated about the diagnosis, prognosis, indications, risks and benefits of treatment. An opportunity to ask questions was given to the patient/FAMILY and questions were answered. Thank you for involving me in the care of Won Bernard. Please do not hesitate to call for any questions or concerns.          Electronically signed by Young Red MD on 4/17/2021 at 10:53 AM

## 2021-04-17 NOTE — ED PROVIDER NOTES
Patient was discussed with the with the MLP. Please refer the MLP's note for final disposition and medical decision making. Subjective:      Patient suffered a blunt trauma injury to his right anterior shin on April 4. She had increasing pain swelling and now purulent drainage for the past several days. He has significant erythema. He denies any history of MRSA in the past.  He denies any history of diabetes or immune compromise. He is morbidly obese. Objective:         Vitals:    04/16/21 1551   BP: (!) 169/77   Pulse: 120   Resp: 18   Temp: 97.3 °F (36.3 °C)   SpO2: 95%     Right lower extremity shows anterior shin cellulitis with abscess formation and active purulent drainage. Assessment:     Cellulitis and abscess right anterior shin    Plan:                   IV antibiotics and admit    P.  Jo Dewey MD  04/16/21 2028

## 2021-04-18 ENCOUNTER — APPOINTMENT (OUTPATIENT)
Dept: CT IMAGING | Age: 31
DRG: 603 | End: 2021-04-18

## 2021-04-18 LAB
ALBUMIN SERPL-MCNC: 2.5 G/DL (ref 3.5–5.2)
ALP BLD-CCNC: 46 U/L (ref 40–129)
ALT SERPL-CCNC: 26 U/L (ref 0–40)
ANION GAP SERPL CALCULATED.3IONS-SCNC: 9 MMOL/L (ref 7–16)
AST SERPL-CCNC: 13 U/L (ref 0–39)
BASOPHILS ABSOLUTE: 0.05 E9/L (ref 0–0.2)
BASOPHILS RELATIVE PERCENT: 0.6 % (ref 0–2)
BILIRUB SERPL-MCNC: 0.2 MG/DL (ref 0–1.2)
BUN BLDV-MCNC: 6 MG/DL (ref 6–20)
CALCIUM SERPL-MCNC: 9 MG/DL (ref 8.6–10.2)
CHLORIDE BLD-SCNC: 102 MMOL/L (ref 98–107)
CO2: 26 MMOL/L (ref 22–29)
CREAT SERPL-MCNC: 0.6 MG/DL (ref 0.7–1.2)
EOSINOPHILS ABSOLUTE: 0.23 E9/L (ref 0.05–0.5)
EOSINOPHILS RELATIVE PERCENT: 2.6 % (ref 0–6)
GFR AFRICAN AMERICAN: >60
GFR NON-AFRICAN AMERICAN: >60 ML/MIN/1.73
GLUCOSE BLD-MCNC: 91 MG/DL (ref 74–99)
HCT VFR BLD CALC: 40.4 % (ref 37–54)
HEMOGLOBIN: 12.7 G/DL (ref 12.5–16.5)
IMMATURE GRANULOCYTES #: 0.04 E9/L
IMMATURE GRANULOCYTES %: 0.5 % (ref 0–5)
LYMPHOCYTES ABSOLUTE: 2.64 E9/L (ref 1.5–4)
LYMPHOCYTES RELATIVE PERCENT: 30.4 % (ref 20–42)
MCH RBC QN AUTO: 30.4 PG (ref 26–35)
MCHC RBC AUTO-ENTMCNC: 31.4 % (ref 32–34.5)
MCV RBC AUTO: 96.7 FL (ref 80–99.9)
MONOCYTES ABSOLUTE: 0.59 E9/L (ref 0.1–0.95)
MONOCYTES RELATIVE PERCENT: 6.8 % (ref 2–12)
NEUTROPHILS ABSOLUTE: 5.14 E9/L (ref 1.8–7.3)
NEUTROPHILS RELATIVE PERCENT: 59.1 % (ref 43–80)
PDW BLD-RTO: 13.3 FL (ref 11.5–15)
PLATELET # BLD: 342 E9/L (ref 130–450)
PMV BLD AUTO: 9.8 FL (ref 7–12)
POTASSIUM SERPL-SCNC: 3.8 MMOL/L (ref 3.5–5)
RBC # BLD: 4.18 E12/L (ref 3.8–5.8)
SODIUM BLD-SCNC: 137 MMOL/L (ref 132–146)
TOTAL PROTEIN: 7 G/DL (ref 6.4–8.3)
WBC # BLD: 8.7 E9/L (ref 4.5–11.5)

## 2021-04-18 PROCEDURE — 73701 CT LOWER EXTREMITY W/DYE: CPT

## 2021-04-18 PROCEDURE — 86703 HIV-1/HIV-2 1 RESULT ANTBDY: CPT

## 2021-04-18 PROCEDURE — 85025 COMPLETE CBC W/AUTO DIFF WBC: CPT

## 2021-04-18 PROCEDURE — 36415 COLL VENOUS BLD VENIPUNCTURE: CPT

## 2021-04-18 PROCEDURE — 86592 SYPHILIS TEST NON-TREP QUAL: CPT

## 2021-04-18 PROCEDURE — 86803 HEPATITIS C AB TEST: CPT

## 2021-04-18 PROCEDURE — 6360000002 HC RX W HCPCS: Performed by: INTERNAL MEDICINE

## 2021-04-18 PROCEDURE — 2580000003 HC RX 258: Performed by: INTERNAL MEDICINE

## 2021-04-18 PROCEDURE — 6360000004 HC RX CONTRAST MEDICATION: Performed by: RADIOLOGY

## 2021-04-18 PROCEDURE — 6370000000 HC RX 637 (ALT 250 FOR IP): Performed by: INTERNAL MEDICINE

## 2021-04-18 PROCEDURE — 1200000000 HC SEMI PRIVATE

## 2021-04-18 PROCEDURE — 80053 COMPREHEN METABOLIC PANEL: CPT

## 2021-04-18 RX ORDER — MORPHINE SULFATE 4 MG/ML
4 INJECTION, SOLUTION INTRAMUSCULAR; INTRAVENOUS EVERY 6 HOURS PRN
Status: DISCONTINUED | OUTPATIENT
Start: 2021-04-18 | End: 2021-04-18

## 2021-04-18 RX ORDER — KETOROLAC TROMETHAMINE 30 MG/ML
30 INJECTION, SOLUTION INTRAMUSCULAR; INTRAVENOUS ONCE
Status: DISCONTINUED | OUTPATIENT
Start: 2021-04-18 | End: 2021-04-19 | Stop reason: HOSPADM

## 2021-04-18 RX ORDER — MORPHINE SULFATE 4 MG/ML
4 INJECTION, SOLUTION INTRAMUSCULAR; INTRAVENOUS EVERY 6 HOURS PRN
Status: DISCONTINUED | OUTPATIENT
Start: 2021-04-18 | End: 2021-04-19 | Stop reason: HOSPADM

## 2021-04-18 RX ORDER — SENNA AND DOCUSATE SODIUM 50; 8.6 MG/1; MG/1
2 TABLET, FILM COATED ORAL 2 TIMES DAILY
Status: DISCONTINUED | OUTPATIENT
Start: 2021-04-18 | End: 2021-04-19 | Stop reason: HOSPADM

## 2021-04-18 RX ADMIN — METOPROLOL SUCCINATE 25 MG: 25 TABLET, EXTENDED RELEASE ORAL at 09:01

## 2021-04-18 RX ADMIN — MORPHINE SULFATE 4 MG: 4 INJECTION, SOLUTION INTRAMUSCULAR; INTRAVENOUS at 16:30

## 2021-04-18 RX ADMIN — MORPHINE SULFATE 4 MG: 4 INJECTION, SOLUTION INTRAMUSCULAR; INTRAVENOUS at 22:25

## 2021-04-18 RX ADMIN — NYSTATIN 500000 UNITS: 100000 SUSPENSION ORAL at 13:31

## 2021-04-18 RX ADMIN — MORPHINE SULFATE 4 MG: 4 INJECTION, SOLUTION INTRAMUSCULAR; INTRAVENOUS at 10:22

## 2021-04-18 RX ADMIN — NYSTATIN 500000 UNITS: 100000 SUSPENSION ORAL at 20:44

## 2021-04-18 RX ADMIN — DOCUSATE SODIUM 50 MG AND SENNOSIDES 8.6 MG 2 TABLET: 8.6; 5 TABLET, FILM COATED ORAL at 20:45

## 2021-04-18 RX ADMIN — FUROSEMIDE 40 MG: 40 TABLET ORAL at 09:01

## 2021-04-18 RX ADMIN — OXYCODONE AND ACETAMINOPHEN 1 TABLET: 5; 325 TABLET ORAL at 13:32

## 2021-04-18 RX ADMIN — OXYCODONE AND ACETAMINOPHEN 1 TABLET: 5; 325 TABLET ORAL at 09:01

## 2021-04-18 RX ADMIN — ENOXAPARIN SODIUM 40 MG: 40 INJECTION SUBCUTANEOUS at 09:01

## 2021-04-18 RX ADMIN — NYSTATIN 500000 UNITS: 100000 SUSPENSION ORAL at 09:01

## 2021-04-18 RX ADMIN — OXYCODONE AND ACETAMINOPHEN 1 TABLET: 5; 325 TABLET ORAL at 04:53

## 2021-04-18 RX ADMIN — MORPHINE SULFATE 4 MG: 4 INJECTION, SOLUTION INTRAMUSCULAR; INTRAVENOUS at 03:29

## 2021-04-18 RX ADMIN — OXYCODONE AND ACETAMINOPHEN 1 TABLET: 5; 325 TABLET ORAL at 17:46

## 2021-04-18 RX ADMIN — CEFTRIAXONE 2000 MG: 2 INJECTION, POWDER, FOR SOLUTION INTRAMUSCULAR; INTRAVENOUS at 20:44

## 2021-04-18 RX ADMIN — OXYCODONE AND ACETAMINOPHEN 1 TABLET: 5; 325 TABLET ORAL at 21:43

## 2021-04-18 RX ADMIN — IOPAMIDOL 75 ML: 755 INJECTION, SOLUTION INTRAVENOUS at 12:57

## 2021-04-18 RX ADMIN — DOCUSATE SODIUM 50 MG AND SENNOSIDES 8.6 MG 2 TABLET: 8.6; 5 TABLET, FILM COATED ORAL at 09:01

## 2021-04-18 RX ADMIN — NYSTATIN 500000 UNITS: 100000 SUSPENSION ORAL at 16:30

## 2021-04-18 RX ADMIN — LISINOPRIL 20 MG: 10 TABLET ORAL at 09:01

## 2021-04-18 RX ADMIN — VANCOMYCIN HYDROCHLORIDE 1000 MG: 1 INJECTION, POWDER, LYOPHILIZED, FOR SOLUTION INTRAVENOUS at 09:01

## 2021-04-18 ASSESSMENT — PAIN SCALES - GENERAL
PAINLEVEL_OUTOF10: 4
PAINLEVEL_OUTOF10: 4
PAINLEVEL_OUTOF10: 7
PAINLEVEL_OUTOF10: 4
PAINLEVEL_OUTOF10: 2
PAINLEVEL_OUTOF10: 7
PAINLEVEL_OUTOF10: 8
PAINLEVEL_OUTOF10: 4
PAINLEVEL_OUTOF10: 3
PAINLEVEL_OUTOF10: 8

## 2021-04-18 ASSESSMENT — PAIN DESCRIPTION - LOCATION: LOCATION: LEG

## 2021-04-18 ASSESSMENT — PAIN DESCRIPTION - ORIENTATION: ORIENTATION: RIGHT

## 2021-04-18 ASSESSMENT — PAIN DESCRIPTION - PAIN TYPE
TYPE: ACUTE PAIN
TYPE: ACUTE PAIN

## 2021-04-18 NOTE — H&P
and is taking his diuretic therapy. He does have  a history of hypertension. He does admit to having chronic peripheral  edema along with swelling of the lower extremities. Chest x-ray at this  time did show some mild pulmonary congestion, evidence of some mild  cardiomegaly. Respiratory wise, he does not smoke, he denies any  productive cough, TB or asthma. Gastrointestinal wise, he denies any  recent change in bowel habits, hematochezia, or rectal bleeding. The  patient has been loosing weight at this time due to caloric restriction. Genitourinary wise, he denies any dysuria, hematuria, or pyuria. PAST MEDICAL HISTORY:  Positive for usual childhood disease, class 3  obesity with elevated BMI of 49.43, chronic obstructive pulmonary  disease, obstructive sleep apnea, noncompliant with C-PAP, history of  systolic and diastolic congestive heart failure with reduced ejection  fraction. PAST SURGICAL HISTORY:  Tonsillectomy. FAMILY HISTORY:  Mother is alive. Father . SOCIAL HISTORY:  The patient does not smoke. Social use of alcohol. Currently single. No children. Self employed as a . MEDICATIONS:  Prior to admission at this time include Lasix 40 mg daily,  Zestril 20 daily, Toprol-XL 25 daily, doxycycline 100 q.12 and nystatin. ALLERGIES:  HE IS ALLERGIC TO SULFA ANTIBIOTIC. REVIEW OF SYSTEMS:  Otherwise except for his current dermatological  issue is unremarkable. REVIEW OF THE CHART:  Hemoglobin A1c is 5.7. Sed rate is 105. CMP was  normal.  EKG is showing normal sinus rhythm. Chest x-ray shows mild  cardiomegaly. PHYSICAL EXAMINATION:  GENERAL APPEARANCE:  Pleasant, 25-year-old white male, who is alert,  responsive, oriented to person, place, and time, and is obese. VITAL SIGNS:  Height 6 feet 2 inches, weight is 385 pounds, BMI 49.43,  blood pressure is 135/75, pulse 80, and respirations 18.   HEENT:  Normal.  The patient did have a full beard that was short  stature. LUNGS:  Diminished. CARDIOVASCULAR:  Heart was fairly regular. No S3 and S4. ABDOMEN:  Soft, pendulous. No guarding, rebound, or rigidity. EXTREMITIES:  Without significant amount of erythema and swelling of the  right lower extremity. An open area. NEUROLOGIC:  Grossly intact. BREASTS:  Normal male. RECTAL AND GENITAL:  Not performed. IMPRESSION:  At this time include,  1. Cellulitis of the right lower extremity. 2.  Essential hypertension. 3.  History of systolic and diastolic congestive heart failure. 4.  Obesity with elevated BMI of 49.43.  5.  Obstructive sleep apnea, noncompliant with CPAP. PLAN AND RECOMMENDATION:  At this time currently, the patient appears to  be stable. He has been admitted at this time to the general surgical  floor. Appropriate diagnostic workup has been evaluated. We will  continue DVT prophylaxis, antibiotic had been instituted at this time  along with localized wound care and Infectious Disease consult. Echocardiogram will be performed and further changes will be made based  upon further testing.         [address] [place] [address],     D: 04/17/2021 13:23:40       T: 04/17/2021 16:33:57     [email or web address]  Job#: [id number]     Doc#: [id number]    CC:

## 2021-04-18 NOTE — PROGRESS NOTES
[address] [facility]  [facility]  Progress Note    NAME: James Figueroa  MR:  [id number]  :   1990  DATE OF SERVICE:21    This is a face to face encounter with James Figueroa 31 y.o. male on 21    CHIEF COMPLAINT     ID following for   Chief Complaint   Patient presents with    Cellulitis     Pt presents today for cellulitis on right leg. Pt states that he has been taking medication as prescribed by pcp last week. Antibiotic is doxycline. HISTORYOF PRESENT ILLNESS   Pt in bed feels better has less pain    Patient is tolerating medications. No reported adverse drug reactions. REVIEW OF SYSTEMS     As stated above in the chief complaint, otherwise negative. CURRENT MEDICATIONS     Scheduled Meds:   sennosides-docusate sodium  2 tablet Oral BID    furosemide  40 mg Oral Daily    lisinopril  20 mg Oral Daily    metoprolol succinate  25 mg Oral Daily    nystatin  5 mL Oral 4x Daily    enoxaparin  40 mg Subcutaneous Daily    vancomycin  1,000 mg Intravenous Q12H    cefTRIAXone (ROCEPHIN) IV  2,000 mg Intravenous Q24H     Continuous Infusions:  PRN Meds:morphine, acetaminophen, oxyCODONE-acetaminophen    PHYSICAL EXAM     BP (!) 148/85   Pulse 67   Temp 97.5 °F (36.4 °C) (Oral)   Resp 16   Ht 6' 2\" (1.88 m)   Wt (!) 385 lb (174.6 kg)   SpO2 96%   BMI 49.43 kg/m²   Temp  Av.7 °F (36.5 °C)  Min: 97.5 °F (36.4 °C)  Max: 97.9 °F (36.6 °C)  Constitutional:  The patient is awake, alert, and oriented. Skin:    Warm and dry. No rashes were noted. HEENT:   Round and reactive pupils. AT/NC  Neck:    Supple to movements. Chest:   No use of accessory muscles to breathe. Symmetrical expansion. Cardiovascular:  S1 and S2 are rhythmic and regular. No murmurs appreciated. Abdomen:   Positive bowel sounds to auscultation. Benign to palpation. Extremities:   No clubbing, no cyanosis, edema.  rle ant tibia wound about 1.5 cm periwound less red  CNS    AAxO Lines: piv      DIAGNOSTIC RESULTS   Radiology:    Recent Labs     04/16/21  1900 04/17/21  0556 04/18/21  0326   WBC 16.3* 14.1* 8.7   RBC 4.56 3.87 4.18   HGB 14.0 11.9* 12.7   HCT 43.6 37.2 40.4   MCV 95.6 96.1 96.7   MCH 30.7 30.7 30.4   MCHC 32.1 32.0 31.4*   RDW 13.4 13.5 13.3    380 342   MPV 9.8 9.9 9.8     Recent Labs     04/16/21  1900 04/17/21  0556 04/18/21  0326    140 137   K 3.8 3.6 3.8   CL 99 105 102   CO2 24 27 26   BUN 7 7 6   CREATININE 0.7 0.7 0.6*   GLUCOSE 81 112* 91   PROT 8.7* 7.2 7.0   LABALBU 3.1* 2.9* 2.5*   CALCIUM 9.5 8.8 9.0   BILITOT 0.4 0.2 0.2   ALKPHOS 63 55 46   AST 23 21 13   ALT 38 32 26     No results found for: CRP  Lab Results   Component Value Date    SEDRATE 105 (H) 04/17/2021     Recent Labs     04/16/21 1900 04/17/21  0556 04/18/21  0326   TROPONINI <0.01  --   --    AST 23 21 13   ALT 38 32 26   TRIG  --  81  --      Lab Results   Component Value Date    CHOL 81 04/17/2021    TRIG 81 04/17/2021    HDL 23 04/17/2021    LDLCALC 42 04/17/2021    LABVLDL 16 04/17/2021       Microbiology:   No results for input(s): COVID19 in the last 72 hours.   Lab Results   Component Value Date    BC 24 Hours no growth 04/16/2021    BLOODCULT2 24 Hours no growth 04/16/2021    ORG Strep pyogenes (Beta Strep Group A) 04/17/2021    ORG Strep pyogenes (Beta Strep Group A) 04/16/2021     WOUND/ABSCESS   Date Value Ref Range Status   04/17/2021 Moderate growth  Sensitivity to follow    Preliminary   04/16/2021 Moderate growth  Sensitivity to follow    Preliminary     Recent Labs     04/17/21  1130   ORG Strep pyogenes (Beta Strep Group A)*     Recent Labs     04/16/21  2154 04/17/21  1130   WNDABS Moderate growth  Sensitivity to follow   Moderate growth  Sensitivity to follow     ORG Strep pyogenes (Beta Strep Group A)* Strep pyogenes (Beta Strep Group A)*       FINAL IMPRESSION    Leukocytosis  rle complicated cellulitis with ulcer  -prelim cx S pyogenes  -aso up     enoxaparin (LOVENOX) injection 40 mg, Daily  vancomycin (VANCOCIN) 1,000 mg in dextrose 5 % 250 mL IVPB, Q12H will stop  cefTRIAXone (ROCEPHIN) 2,000 mg in sterile water 20 mL IV syringe, Q24H    Will switch to po in am      · Monitor labs    Imaging and labs were reviewed per medical records. The patient was educated about the diagnosis, prognosis, indications, risks and benefits of treatment. An opportunity to ask questions was given to the patient/FAMILY. Thank you for involving me in the care of Baudilio Joseph will continue to follow. Please do not hesitate to call for any questions or concerns.     Electronically signed by Albin Read MD on 4/18/2021 at 2:29 PM

## 2021-04-18 NOTE — PROGRESS NOTES
Internal Medicine Progress Note    [facility]    Chip Ruiz, F.A.CJeremyOJeremyI. Sathya Sher D.O., ANDREW Velarde D.O. Brady Fernandez, MSN, APRN, NP-C  Byron Gibson. Kevin Villatoro, MSN, APRN-CNP     Primary Care Physician: Jessi Parker DO   Admitting Physician:  Abraham De Luna DO  Admission date and time: 4/16/2021  8:14 PM    Room:  03390339-02  Admitting diagnosis: Cellulitis of right leg [B69.030]    Patient Name: Ottoniel Perla  MRN: [id number]    Date of Service: 4/18/2021     Subjective:  Ronn Ovalles is a 31 y.o. male who was seen and examined today,4/18/2021, at the bedside. Ronn Ovalles subjectively feels as though the right lower extremity is less swollen and it is less painful. No further significant drainage after the initial spontaneous rupture has been described. We discussed continuation of IV antibiotics at the discretion of ID with likely discharge tomorrow. Per ID, vascular studies are recommended. No family present during my examination. Review of System:   Constitutional:   Denies fever or chills, weight loss or gain, fatigue or malaise. HEENT:   Denies ear pain, sore throat, sinus or eye problems. Cardiovascular:   Denies any chest pain, irregular heartbeats, or palpitations. Respiratory:   Denies shortness of breath, coughing, sputum production, hemoptysis, or wheezing. Gastrointestinal:   Denies nausea, vomiting, diarrhea, or constipation. Denies any abdominal pain. Genitourinary:    Denies any urgency, frequency, hematuria. Voiding  without difficulty. Extremities:   Chronic lower extremity swelling, mildly worse on the right  Neurology:    Denies any headache or focal neurological deficits, Denies generalized weakness or memory difficulty. Psch:   Denies being anxious or depressed. Musculoskeletal:    Denies  myalgias, joint complaints or back pain. Integumentary:   Denies any rashes, ulcers, or excoriations.   Denies bruising. Hematologic/Lymphatic:  Denies bruising or bleeding. Physical Exam:  No intake/output data recorded. Intake/Output Summary (Last 24 hours) at 4/18/2021 0706  Last data filed at 4/17/2021 2250  Gross per 24 hour   Intake 780 ml   Output 0 ml   Net 780 ml   I/O last 3 completed shifts: In: 780 [P.O.:780]  Out: 0   Patient Vitals for the past 96 hrs (Last 3 readings):   Weight   04/16/21 1551 (!) 385 lb (174.6 kg)     Vital Signs:   Blood pressure (!) 148/85, pulse 67, temperature 97.5 °F (36.4 °C), temperature source Oral, resp. rate 16, height 6' 2\" (1.88 m), weight (!) 385 lb (174.6 kg), SpO2 96 %. General appearance:  Alert, responsive, oriented to person, place, and time. Comfortable appearing, no distress. Head:  Normocephalic. No masses, lesions or tenderness. Eyes:  PERRLA. EOMI. Sclera clear. Buccal mucosa moist.  ENT:  Ears normal. Mucosa normal.  Neck:    Supple. Trachea midline. No thyromegaly. No JVD. No bruits. Heart:    Rhythm regular. Rate controlled. S1 and S2.  Lungs:    Symmetrical. Clear to auscultation bilaterally. No wheezes. No rhonchi. No rales. Abdomen:   Soft. Morbidly obese. Non-tender. Non-distended. Bowel sounds positive. No organomegaly or masses. No pain on palpation. Extremities:    Peripheral pulses present. Bilateral lower extremities are enlarged symmetrically without any significant pitting. The right lower extremity has wound appropriately wrapped and dressed with Kerlix overlying with extension of erythema both inferiorly and superiorly. No ulcers. No cyanosis. No clubbing. Neurologic:    Alert x 3. No focal deficit. Cranial nerves grossly intact. No focal weakness. Psych:   Behavior is normal. Mood appears normal. Speech is not rapid and/or pressured. Musculoskeletal:   Spine ROM normal. Muscular strength intact. Gait not assessed. Integumentary:  RLE as discussed. No rashes  Skin normal color and texture.   Genitalia/Breast:  Deferred Medication:  Scheduled Meds:   sennosides-docusate sodium  2 tablet Oral BID    furosemide  40 mg Oral Daily    lisinopril  20 mg Oral Daily    metoprolol succinate  25 mg Oral Daily    nystatin  5 mL Oral 4x Daily    enoxaparin  40 mg Subcutaneous Daily    vancomycin  1,000 mg Intravenous Q12H    cefTRIAXone (ROCEPHIN) IV  2,000 mg Intravenous Q24H     Continuous Infusions:    Objective Data:  CBC with Differential:    Lab Results   Component Value Date    WBC 8.7 04/18/2021    RBC 4.18 04/18/2021    HGB 12.7 04/18/2021    HCT 40.4 04/18/2021     04/18/2021    MCV 96.7 04/18/2021    MCH 30.4 04/18/2021    MCHC 31.4 04/18/2021    RDW 13.3 04/18/2021    LYMPHOPCT 30.4 04/18/2021    MONOPCT 6.8 04/18/2021    BASOPCT 0.6 04/18/2021    MONOSABS 0.59 04/18/2021    LYMPHSABS 2.64 04/18/2021    EOSABS 0.23 04/18/2021    BASOSABS 0.05 04/18/2021     BMP:    Lab Results   Component Value Date     04/18/2021    K 3.8 04/18/2021    K 3.8 04/16/2021     04/18/2021    CO2 26 04/18/2021    BUN 6 04/18/2021    LABALBU 2.5 04/18/2021    CREATININE 0.6 04/18/2021    CALCIUM 9.0 04/18/2021    GFRAA >60 04/18/2021    LABGLOM >60 04/18/2021    GLUCOSE 91 04/18/2021       Wound Documentation:   Wound 04/17/21 Leg Anterior;Right (Active)   Dressing Status New dressing applied;Clean;Dry;New drainage noted 04/18/21 0501   Wound Cleansed Cleansed with saline 04/18/21 0501   Dressing/Treatment Roll gauze;Dry dressing 04/18/21 0501   Wound Length (cm) 3 cm 04/17/21 0838   Wound Width (cm) 2.5 cm 04/17/21 0838   Wound Surface Area (cm^2) 7.5 cm^2 04/17/21 0838   Drainage Amount Moderate 04/18/21 0501   Drainage Description Serosanguinous 04/18/21 0501   Odor None 04/18/21 0501   Sarah-wound Assessment Blanchable erythema; Warm;Edematous 04/18/21 0501   Number of days: 0       Assessment:  · Acute right lower extremity cellulitis and abscess with spontaneous rupture  · Chronic compensated systolic and diastolic congestive heart failure  · Essential hypertension  · Morbid obesity with BMI of 49.43 kg/m²  · Obstructive sleep apnea noncompliant with CPAP therapy  · Moderate protein malnutrition    Plan:   Tono Smith has responded well to IV antibiotics. Infectious diseases following and infectious work-up is being undertaken. Pending continued improvement we will plan for discharge tomorrow. As per the recommendations of infectious disease, vascular ultrasound will be performed today for further evaluation. Chronic comorbidities labs vital signs are being monitored and addressed accordingly. Leukocytosis has trended downward with treatment administered. Continue current therapy. See orders for further plan of care. More than 50% of my time was spent at the bedside counseling/coordinating care with the patient and/or family with face to face contact. This time was spent reviewing notes and laboratory data as well as instructing and counseling the patient. Time I spent with the family or surrogate(s) is included only if the patient was incapable of providing the necessary information or participating in medical decisions. I also discussed the differential diagnosis and all of the proposed management plans with the patient and individuals accompanying the patient. I am readily available for any further decision-making and intervention.        ADRIANNE Troy CNP  4/18/2021  7:06 AM

## 2021-04-18 NOTE — PLAN OF CARE
Problem: Pain:  Description: Pain management should include both nonpharmacologic and pharmacologic interventions. Goal: Pain level will decrease  Description: Pain level will decrease  4/18/2021 0034 by Shruti Duke RN  Outcome: Met This Shift  4/18/2021 0033 by Shruti Duke RN  Outcome: Met This Shift  4/17/2021 1733 by Drucie Harada, RN  Outcome: Met This Shift     Problem: Pain:  Description: Pain management should include both nonpharmacologic and pharmacologic interventions.   Goal: Control of acute pain  Description: Control of acute pain  4/18/2021 0034 by Shruti Duke RN  Outcome: Met This Shift  4/17/2021 1733 by Drucie Harada, RN  Outcome: Met This Shift     Problem: Skin Integrity:  Goal: Will show no infection signs and symptoms  Description: Will show no infection signs and symptoms  Outcome: Met This Shift

## 2021-04-19 VITALS
WEIGHT: 315 LBS | RESPIRATION RATE: 18 BRPM | SYSTOLIC BLOOD PRESSURE: 131 MMHG | HEIGHT: 74 IN | BODY MASS INDEX: 40.43 KG/M2 | HEART RATE: 82 BPM | TEMPERATURE: 97.9 F | DIASTOLIC BLOOD PRESSURE: 75 MMHG | OXYGEN SATURATION: 92 %

## 2021-04-19 LAB
ALBUMIN SERPL-MCNC: 2.7 G/DL (ref 3.5–5.2)
ALP BLD-CCNC: 45 U/L (ref 40–129)
ALT SERPL-CCNC: 27 U/L (ref 0–40)
ANAEROBIC CULTURE: NORMAL
ANION GAP SERPL CALCULATED.3IONS-SCNC: 5 MMOL/L (ref 7–16)
AST SERPL-CCNC: 16 U/L (ref 0–39)
BASOPHILS ABSOLUTE: 0.07 E9/L (ref 0–0.2)
BASOPHILS RELATIVE PERCENT: 0.9 % (ref 0–2)
BILIRUB SERPL-MCNC: <0.2 MG/DL (ref 0–1.2)
BUN BLDV-MCNC: 8 MG/DL (ref 6–20)
CALCIUM SERPL-MCNC: 9.2 MG/DL (ref 8.6–10.2)
CHLORIDE BLD-SCNC: 103 MMOL/L (ref 98–107)
CO2: 31 MMOL/L (ref 22–29)
CREAT SERPL-MCNC: 0.7 MG/DL (ref 0.7–1.2)
EOSINOPHILS ABSOLUTE: 0.2 E9/L (ref 0.05–0.5)
EOSINOPHILS RELATIVE PERCENT: 2.5 % (ref 0–6)
GFR AFRICAN AMERICAN: >60
GFR NON-AFRICAN AMERICAN: >60 ML/MIN/1.73
GLUCOSE BLD-MCNC: 121 MG/DL (ref 74–99)
HCT VFR BLD CALC: 40.5 % (ref 37–54)
HEMOGLOBIN: 12.7 G/DL (ref 12.5–16.5)
HEPATITIS C ANTIBODY INTERPRETATION: NORMAL
HIV-1 AND HIV-2 ANTIBODIES: NORMAL
IMMATURE GRANULOCYTES #: 0.05 E9/L
IMMATURE GRANULOCYTES %: 0.6 % (ref 0–5)
LYMPHOCYTES ABSOLUTE: 2.59 E9/L (ref 1.5–4)
LYMPHOCYTES RELATIVE PERCENT: 32.9 % (ref 20–42)
MCH RBC QN AUTO: 30.6 PG (ref 26–35)
MCHC RBC AUTO-ENTMCNC: 31.4 % (ref 32–34.5)
MCV RBC AUTO: 97.6 FL (ref 80–99.9)
MONOCYTES ABSOLUTE: 0.53 E9/L (ref 0.1–0.95)
MONOCYTES RELATIVE PERCENT: 6.7 % (ref 2–12)
NEUTROPHILS ABSOLUTE: 4.44 E9/L (ref 1.8–7.3)
NEUTROPHILS RELATIVE PERCENT: 56.4 % (ref 43–80)
ORGANISM: ABNORMAL
ORGANISM: ABNORMAL
PDW BLD-RTO: 13.2 FL (ref 11.5–15)
PLATELET # BLD: 368 E9/L (ref 130–450)
PMV BLD AUTO: 10 FL (ref 7–12)
POTASSIUM SERPL-SCNC: 3.8 MMOL/L (ref 3.5–5)
RBC # BLD: 4.15 E12/L (ref 3.8–5.8)
RPR: NORMAL
SODIUM BLD-SCNC: 139 MMOL/L (ref 132–146)
TOTAL PROTEIN: 7.1 G/DL (ref 6.4–8.3)
URINE CULTURE, ROUTINE: NORMAL
WBC # BLD: 7.9 E9/L (ref 4.5–11.5)
WOUND/ABSCESS: ABNORMAL
WOUND/ABSCESS: ABNORMAL

## 2021-04-19 PROCEDURE — 36415 COLL VENOUS BLD VENIPUNCTURE: CPT

## 2021-04-19 PROCEDURE — 6360000002 HC RX W HCPCS: Performed by: INTERNAL MEDICINE

## 2021-04-19 PROCEDURE — 6370000000 HC RX 637 (ALT 250 FOR IP): Performed by: INTERNAL MEDICINE

## 2021-04-19 PROCEDURE — 6370000000 HC RX 637 (ALT 250 FOR IP): Performed by: CLINICAL NURSE SPECIALIST

## 2021-04-19 PROCEDURE — 85025 COMPLETE CBC W/AUTO DIFF WBC: CPT

## 2021-04-19 PROCEDURE — 80053 COMPREHEN METABOLIC PANEL: CPT

## 2021-04-19 RX ORDER — CEFDINIR 300 MG/1
300 CAPSULE ORAL EVERY 12 HOURS SCHEDULED
Qty: 28 CAPSULE | Refills: 0 | Status: SHIPPED | OUTPATIENT
Start: 2021-04-19 | End: 2021-05-03

## 2021-04-19 RX ORDER — OXYCODONE HYDROCHLORIDE AND ACETAMINOPHEN 5; 325 MG/1; MG/1
1 TABLET ORAL EVERY 6 HOURS PRN
Qty: 12 TABLET | Refills: 0 | Status: SHIPPED | OUTPATIENT
Start: 2021-04-19 | End: 2021-04-22

## 2021-04-19 RX ORDER — CEFDINIR 300 MG/1
300 CAPSULE ORAL EVERY 12 HOURS SCHEDULED
Status: DISCONTINUED | OUTPATIENT
Start: 2021-04-19 | End: 2021-04-19 | Stop reason: HOSPADM

## 2021-04-19 RX ORDER — SENNA AND DOCUSATE SODIUM 50; 8.6 MG/1; MG/1
2 TABLET, FILM COATED ORAL 2 TIMES DAILY
Qty: 40 TABLET | Refills: 0 | Status: SHIPPED | OUTPATIENT
Start: 2021-04-19 | End: 2021-10-29

## 2021-04-19 RX ADMIN — MORPHINE SULFATE 4 MG: 4 INJECTION, SOLUTION INTRAMUSCULAR; INTRAVENOUS at 05:45

## 2021-04-19 RX ADMIN — OXYCODONE AND ACETAMINOPHEN 1 TABLET: 5; 325 TABLET ORAL at 02:11

## 2021-04-19 RX ADMIN — DOCUSATE SODIUM 50 MG AND SENNOSIDES 8.6 MG 2 TABLET: 8.6; 5 TABLET, FILM COATED ORAL at 08:33

## 2021-04-19 RX ADMIN — OXYCODONE AND ACETAMINOPHEN 1 TABLET: 5; 325 TABLET ORAL at 10:36

## 2021-04-19 RX ADMIN — METOPROLOL SUCCINATE 25 MG: 25 TABLET, EXTENDED RELEASE ORAL at 08:33

## 2021-04-19 RX ADMIN — FUROSEMIDE 40 MG: 40 TABLET ORAL at 08:33

## 2021-04-19 RX ADMIN — OXYCODONE AND ACETAMINOPHEN 1 TABLET: 5; 325 TABLET ORAL at 06:34

## 2021-04-19 RX ADMIN — CEFDINIR 300 MG: 300 CAPSULE ORAL at 10:43

## 2021-04-19 RX ADMIN — LISINOPRIL 20 MG: 10 TABLET ORAL at 08:34

## 2021-04-19 RX ADMIN — NYSTATIN 500000 UNITS: 100000 SUSPENSION ORAL at 08:33

## 2021-04-19 ASSESSMENT — PAIN SCALES - GENERAL
PAINLEVEL_OUTOF10: 8
PAINLEVEL_OUTOF10: 4
PAINLEVEL_OUTOF10: 8
PAINLEVEL_OUTOF10: 8
PAINLEVEL_OUTOF10: 7

## 2021-04-19 NOTE — DISCHARGE SUMMARY
room yesterday where he was admitted at that  time under service of Dr. Han Gallegos and Dr. Brianna Lane. The patient does relate  to having swelling. Venous duplex study of the lower extremities showed  no evidence of any thrombus. He was admitted to the hospital at this  time to the general surgical floor. From a cardiac standpoint view, the  patient does have a history of obstructive sleep apnea and has been less  than compliant with CPAP. He does relate to having chronic swelling of  the lower extremities and is taking his diuretic therapy. He does have  a history of hypertension. He does admit to having chronic peripheral  edema along with swelling of the lower extremities. Chest x-ray at this  time did show some mild pulmonary congestion, evidence of some mild  cardiomegaly. Respiratory wise, he does not smoke, he denies any  productive cough, TB or asthma. Gastrointestinal wise, he denies any  recent change in bowel habits, hematochezia, or rectal bleeding. The  patient has been loosing weight at this time due to caloric restriction. Genitourinary wise, he denies any dysuria, hematuria, or pyuria.     LABS[de-identified]  Lab Results   Component Value Date    WBC 7.9 04/19/2021    HGB 12.7 04/19/2021    HCT 40.5 04/19/2021     04/19/2021     04/19/2021    K 3.8 04/19/2021     04/19/2021    CREATININE 0.7 04/19/2021    BUN 8 04/19/2021    CO2 31 (H) 04/19/2021    GLUCOSE 121 (H) 04/19/2021    ALT 27 04/19/2021    AST 16 04/19/2021     No results found for: INR, PROTIME   Lab Results   Component Value Date    TSH 1.110 04/17/2021     Lab Results   Component Value Date    TRIG 81 04/17/2021    TRIG 104 04/09/2021    TRIG 85 05/16/2017     Lab Results   Component Value Date    HDL 23 04/17/2021    HDL 40 04/09/2021    HDL 41 04/10/2020     Lab Results   Component Value Date    LDLCALC 42 04/17/2021    LDLCALC 49 04/09/2021    LDLCALC 61 04/10/2020     Lab Results   Component Value Date    LABA1C 5.7 (H) tissue swelling without associated abscess, acute fracture, or destructive osseous abnormality. Us Dup Lower Extremity Right Ketan    Result Date: 4/16/2021  EXAMINATION: DUPLEX VENOUS ULTRASOUND OF THE RIGHT LOWER EXTREMITY, 4/16/2021 8:41 pm TECHNIQUE: Duplex ultrasound using B-mode/gray scaled imaging and Doppler spectral analysis and color flow was obtained of the right lower extremity. COMPARISON: None. HISTORY: ORDERING SYSTEM PROVIDED HISTORY: Discomfort TECHNOLOGIST PROVIDED HISTORY: Reason for exam:->Discomfort What reading provider will be dictating this exam?->CRC FINDINGS: The visualized veins of the right lower extremity are patent and free of echogenic thrombus. The veins demonstrate good compressibility with normal color flow study and spectral analysis. No evidence of DVT in the right lower extremity. HOSPITAL COURSE:   Farhana Rodriguez did well throughout hospitalization. He was initially admitted with cellulitis of the right lower extremity with what sounds to be a spontaneously draining abscess. With IV antibiotic administration improved dramatically. Symptomatic and supportive care were employed. Infectious disease provided consultation, infectious work-up was undertaken and antibiotics were guided by said results. Antibiotics were ultimately reconciled by infectious disease prior to discharge. We discussed close outpatient follow-up, Farhana Rodriguez will arrange to see Dr. Blanche Marte on Friday. He is acceptable for discharge home. BRIEF PHYSICAL EXAMINATION AND LABORATORIES ON DAY OF DISCHARGE:  VITALS:  /75   Pulse 82   Temp 97.9 °F (36.6 °C) (Oral)   Resp 18   Ht 6' 2\" (1.88 m)   Wt (!) 385 lb (174.6 kg)   SpO2 92%   BMI 49.43 kg/m²     HEENT:  PERRLA. EOMI. Sclera clear. Buccal mucosa moist.    Neck:  Supple. Trachea midline. No thyromegaly. No JVD. No bruits. Heart:  Rhythm regular, rate controlled. S1 and S2.    Lungs:  Symmetrical. Diminished bibasilar.  Lungs are otherwise clear to auscultation bilaterally. No wheezes. No rhonchi. No rales. Abdomen: Soft. Morbidly obese. Non-tender. Non-distended. Bowel sounds positive. No organomegaly or masses. No pain on palpation    Extremities:  Peripheral pulses present. No peripheral edema. No ulcers. Neurologic:  Alert x 3. No focal deficit. Cranial nerves grossly intact. Skin:  No petechia. No hemorrhage. No wounds. DISPOSITION:  The patient's condition is good. At this time the patient is without objective evidence of an acute process requiring continuing hospitalization or inpatient management. They are stable for discharge with outpatient follow-up. I have spoken with the patient and discussed the results of the current hospitalization, in addition to providing specific details for the plan of care and counseling regarding the diagnosis and prognosis. The plan has been discussed in detail and they are aware of the specific conditions for emergent return, as well as the importance of follow-up. Their questions are answered at this time and they are agreeable with the plan for discharge to home    DISCHARGE MEDICATIONS:    Mata Kennedy   Home Medication Instructions [facility]:[id number]    Printed on:04/19/21 6721   Medication Information                      furosemide (LASIX) 40 MG tablet  Take 1 tablet by mouth daily             lisinopril (PRINIVIL;ZESTRIL) 20 MG tablet  Take 1 tablet by mouth daily             metoprolol succinate (TOPROL XL) 25 MG extended release tablet  Take 1 tablet by mouth daily             nystatin (MYCOSTATIN) 929078 UNIT/ML suspension  Take 5 mLs by mouth 4 times daily for 14 days             oxyCODONE-acetaminophen (PERCOCET) 5-325 MG per tablet  Take 1 tablet by mouth every 6 hours as needed for Pain for up to 3 days. Use caution as may cause drowsiness or sedation. Do not operate machinery, take while working, drive or drink alcohol while taking. sennosides-docusate sodium (SENOKOT-S) 8.6-50 MG tablet  Take 2 tablets by mouth 2 times daily                 FOLLOW UP/INSTRUCTIONS:  · This patient is instructed to follow-up with his primary care physician. · Patient is instructed to follow-up with the consults listed above as directed by them. · he is instructed to resume home medications and take new medications as indicated in the list above. · If the patient has a recurrence of symptoms, he is instructed to go to the ED. Preparing for this patient's discharge, including paperwork, orders, instructions, and meeting with patient did require > 40 minutes.     ADRIANNE Rosen CNP     4/19/2021  7:10 AM

## 2021-04-19 NOTE — PLAN OF CARE
Problem: Pain:  Description: Pain management should include both nonpharmacologic and pharmacologic interventions. Goal: Pain level will decrease  Description: Pain level will decrease  4/18/2021 2353 by Shruti Duke RN  Outcome: Met This Shift  4/18/2021 1622 by Drucie Harada, RN  Outcome: Met This Shift     Problem: Pain:  Description: Pain management should include both nonpharmacologic and pharmacologic interventions.   Goal: Control of acute pain  Description: Control of acute pain  4/18/2021 2353 by Shruti Duke RN  Outcome: Met This Shift     Problem: Skin Integrity:  Goal: Will show no infection signs and symptoms  Description: Will show no infection signs and symptoms  4/18/2021 2353 by Shruti Duke RN  Outcome: Met This Shift  4/18/2021 1622 by Drucie Harada, RN  Outcome: Met This Shift     Problem: Skin Integrity:  Goal: Absence of new skin breakdown  Description: Absence of new skin breakdown  4/18/2021 2353 by Shruti Duke RN  Outcome: Met This Shift  4/18/2021 1622 by Drucie Harada, RN  Outcome: Met This Shift

## 2021-04-19 NOTE — PROGRESS NOTES
[facility]  [facility]  Progress Note    NAME: Leilani Shipley  MR:  [id number]  :   1990  DATE OF SERVICE:21    This is a face to face encounter with Leilani Shipley 31 y.o. male on 21    CHIEF COMPLAINT     ID following for   Chief Complaint   Patient presents with    Cellulitis     Pt presents today for cellulitis on right leg. Pt states that he has been taking medication as prescribed by pcp last week. Antibiotic is doxycline. HISTORYOF PRESENT ILLNESS   Pt in bed feels better has less pain- sitting in bed- ready to go. No issues with atbs   No nausea, vomiting, or diarrhea. Patient is tolerating medications. No reported adverse drug reactions. REVIEW OF SYSTEMS     As stated above in the chief complaint, otherwise negative. CURRENT MEDICATIONS     Scheduled Meds:   sennosides-docusate sodium  2 tablet Oral BID    ketorolac  30 mg Intravenous Once    furosemide  40 mg Oral Daily    lisinopril  20 mg Oral Daily    metoprolol succinate  25 mg Oral Daily    nystatin  5 mL Oral 4x Daily    enoxaparin  40 mg Subcutaneous Daily    cefTRIAXone (ROCEPHIN) IV  2,000 mg Intravenous Q24H     Continuous Infusions:  PRN Meds:morphine, acetaminophen, oxyCODONE-acetaminophen    PHYSICAL EXAM     /75   Pulse 82   Temp 97.9 °F (36.6 °C) (Oral)   Resp 18   Ht 6' 2\" (1.88 m)   Wt (!) 385 lb (174.6 kg)   SpO2 92%   BMI 49.43 kg/m²   Temp  Av.3 °F (36.8 °C)  Min: 97.9 °F (36.6 °C)  Max: 98.6 °F (37 °C)  Constitutional:  The patient is awake, alert, and oriented. Skin:    Warm and dry. No rashes were noted. HEENT:   Round and reactive pupils. AT/NC  Neck:    Supple to movements. Chest:   No use of accessory muscles to breathe. Symmetrical expansion. Cardiovascular:  S1 and S2 are rhythmic and regular. No murmurs appreciated. Abdomen:   Positive bowel sounds to auscultation. Benign to palpation.    Extremities:   No clubbing, no cyanosis, edema. rle ant tibia wound about 1.5 cm periwound less red  CNS    AAxO   Lines: piv      DIAGNOSTIC RESULTS   Radiology:    Recent Labs     04/17/21  0556 04/18/21  0326 04/19/21  0535   WBC 14.1* 8.7 7.9   RBC 3.87 4.18 4.15   HGB 11.9* 12.7 12.7   HCT 37.2 40.4 40.5   MCV 96.1 96.7 97.6   MCH 30.7 30.4 30.6   MCHC 32.0 31.4* 31.4*   RDW 13.5 13.3 13.2    342 368   MPV 9.9 9.8 10.0     Recent Labs     04/17/21  0556 04/18/21  0326 04/19/21  0535    137 139   K 3.6 3.8 3.8    102 103   CO2 27 26 31*   BUN 7 6 8   CREATININE 0.7 0.6* 0.7   GLUCOSE 112* 91 121*   PROT 7.2 7.0 7.1   LABALBU 2.9* 2.5* 2.7*   CALCIUM 8.8 9.0 9.2   BILITOT 0.2 0.2 <0.2   ALKPHOS 55 46 45   AST 21 13 16   ALT 32 26 27     No results found for: CRP  Lab Results   Component Value Date    SEDRATE 105 (H) 04/17/2021     Recent Labs     04/16/21  1900 04/17/21  0556 04/18/21  0326 04/19/21  0535   TROPONINI <0.01  --   --   --    AST 23 21 13 16   ALT 38 32 26 27   TRIG  --  81  --   --      Lab Results   Component Value Date    CHOL 81 04/17/2021    TRIG 81 04/17/2021    HDL 23 04/17/2021    LDLCALC 42 04/17/2021    LABVLDL 16 04/17/2021       Microbiology:   No results for input(s): COVID19 in the last 72 hours.   Lab Results   Component Value Date    BC 24 Hours no growth 04/16/2021    BLOODCULT2 24 Hours no growth 04/16/2021    ORG Strep pyogenes (Beta Strep Group A) 04/17/2021    ORG Strep pyogenes (Beta Strep Group A) 04/16/2021     WOUND/ABSCESS   Date Value Ref Range Status   04/17/2021 Moderate growth  Sensitivity to follow    Preliminary   04/16/2021 Moderate growth  Sensitivity to follow    Preliminary     Recent Labs     04/17/21  1130   ORG Strep pyogenes (Beta Strep Group A)*     Recent Labs     04/16/21  2154 04/17/21  1130   WNDABS Moderate growth  Sensitivity to follow   Moderate growth  Sensitivity to follow     ORG Strep pyogenes (Beta Strep Group A)* Strep pyogenes (Beta Strep Group A)* FINAL IMPRESSION    · Leukocytosis- improved   · rle complicated cellulitis with ulcer  · -prelim cx S pyogenes  · -aso -202  · Was on doxycycline as an outpt     Plan:   · Stop rocephin  · Change to Omnicef  · Can discharge from ID POV  · Continue wound care  · Follow-up with ID in 2 weeks  · Med rec done   · Monitor labs    Imaging and labs were reviewed per medical records. The patient was educated about the diagnosis, prognosis, indications, risks and benefits of treatment. An opportunity to ask questions was given to the patient/FAMILY. Thank you for involving me in the care of Courtney May will continue to follow. Please do not hesitate to call for any questions or concerns.     Electronically signed by ADRIANNE Merchant on 4/19/2021 at 10:09 AM       Pt was d/c prior to round  cx S pyogenes  Can d/c omnicef  F/u IF NOT resolved    Sandra Sarkar MD

## 2021-04-20 ENCOUNTER — TELEPHONE (OUTPATIENT)
Dept: INTERNAL MEDICINE | Age: 31
End: 2021-04-20

## 2021-04-21 LAB
C. TRACHOMATIS DNA ,URINE: NEGATIVE
N. GONORRHOEAE DNA, URINE: NEGATIVE
SOURCE: NORMAL

## 2021-04-22 LAB
BLOOD CULTURE, ROUTINE: NORMAL
CULTURE, BLOOD 2: NORMAL

## 2021-04-30 ENCOUNTER — OFFICE VISIT (OUTPATIENT)
Dept: FAMILY MEDICINE CLINIC | Age: 31
End: 2021-04-30

## 2021-04-30 VITALS
HEART RATE: 80 BPM | RESPIRATION RATE: 18 BRPM | WEIGHT: 315 LBS | SYSTOLIC BLOOD PRESSURE: 132 MMHG | HEIGHT: 74 IN | OXYGEN SATURATION: 93 % | DIASTOLIC BLOOD PRESSURE: 84 MMHG | BODY MASS INDEX: 40.43 KG/M2 | TEMPERATURE: 97.2 F

## 2021-04-30 DIAGNOSIS — Z09 HOSPITAL DISCHARGE FOLLOW-UP: Primary | ICD-10-CM

## 2021-04-30 PROCEDURE — 99495 TRANSJ CARE MGMT MOD F2F 14D: CPT | Performed by: FAMILY MEDICINE

## 2021-04-30 PROCEDURE — 1111F DSCHRG MED/CURRENT MED MERGE: CPT | Performed by: FAMILY MEDICINE

## 2021-04-30 NOTE — PROGRESS NOTES
Medications patient taking as of now reconciled against medications ordered at time of hospital discharge: Yes    Chief Complaint   Patient presents with    Follow-Up from Hospital       History of Present illness - Follow up of Hospital diagnosis(es): Right LE Cellulitis    Inpatient course: Discharge summary reviewed- see chart. Interval history/Current status: Patient states that there is been significant improvement in the wound. It is not painful. Swelling remains, however, he understands that this may take some time to go away. He did visit with infectious disease this morning who recommended continuation of current antibiotics. Patient currently denies chest pain, shortness of breath. Discussion held with patient approaching compression socks over-the-counter. He agreed to this. A comprehensive review of systems was negative except for what was noted in the HPI. There were no vitals filed for this visit. There is no height or weight on file to calculate BMI. Wt Readings from Last 3 Encounters:   04/16/21 (!) 385 lb (174.6 kg)   04/09/21 (!) 399 lb 8 oz (181.2 kg)   10/22/20 (!) 374 lb 3.2 oz (169.7 kg)     BP Readings from Last 3 Encounters:   04/30/21 126/66   04/19/21 131/75   04/09/21 134/74        Physical Exam:  General Appearance: alert and oriented to person, place and time, well developed and well- nourished, in no acute distress  Skin: Right lower extremity with healing wound on shin noted to be 2 cm in diameter with surrounding erythema and some lower extremity swelling.   Head: normocephalic and atraumatic  Eyes: pupils equal, round, and reactive to light, extraocular eye movements intact, conjunctivae normal  Pulmonary/Chest: clear to auscultation bilaterally- no wheezes, rales or rhonchi, normal air movement, no respiratory distress  Cardiovascular: normal rate, regular rhythm, normal S1 and S2, no murmurs, rubs, clicks, or gallops, distal pulses intact, no carotid

## 2021-06-08 LAB
AFB CULTURE (MYCOBACTERIA): NORMAL
AFB SMEAR: NORMAL

## 2021-10-29 ENCOUNTER — OFFICE VISIT (OUTPATIENT)
Dept: PRIMARY CARE CLINIC | Age: 31
End: 2021-10-29

## 2021-10-29 VITALS
SYSTOLIC BLOOD PRESSURE: 116 MMHG | BODY MASS INDEX: 40.43 KG/M2 | TEMPERATURE: 97 F | WEIGHT: 315 LBS | HEIGHT: 74 IN | OXYGEN SATURATION: 96 % | DIASTOLIC BLOOD PRESSURE: 76 MMHG | HEART RATE: 76 BPM

## 2021-10-29 DIAGNOSIS — I50.40 SYSTOLIC AND DIASTOLIC CHF W/REDUCED LV FUNCTION, NYHA CLASS 4 (HCC): ICD-10-CM

## 2021-10-29 PROCEDURE — 99214 OFFICE O/P EST MOD 30 MIN: CPT | Performed by: FAMILY MEDICINE

## 2021-10-29 RX ORDER — METOPROLOL SUCCINATE 25 MG/1
25 TABLET, EXTENDED RELEASE ORAL DAILY
Qty: 90 TABLET | Refills: 0 | Status: SHIPPED
Start: 2021-10-29 | End: 2022-01-31 | Stop reason: SDUPTHER

## 2021-10-29 RX ORDER — FUROSEMIDE 20 MG/1
TABLET ORAL
Qty: 90 TABLET | Refills: 0 | Status: SHIPPED
Start: 2021-10-29 | End: 2022-01-31 | Stop reason: SDUPTHER

## 2021-10-29 RX ORDER — LISINOPRIL 20 MG/1
20 TABLET ORAL DAILY
Qty: 90 TABLET | Refills: 0 | Status: SHIPPED
Start: 2021-10-29 | End: 2022-01-31 | Stop reason: SDUPTHER

## 2021-10-29 RX ORDER — FUROSEMIDE 40 MG/1
40 TABLET ORAL DAILY
Qty: 90 TABLET | Refills: 0 | Status: SHIPPED
Start: 2021-10-29 | End: 2022-01-31 | Stop reason: SDUPTHER

## 2021-10-29 RX ORDER — FUROSEMIDE 20 MG/1
TABLET ORAL
COMMUNITY
Start: 2021-07-26 | End: 2021-10-29 | Stop reason: SDUPTHER

## 2021-10-29 NOTE — PROGRESS NOTES
Subjective:  27 y.o. male who presents to the office today with chief complaint:  Chief Complaint   Patient presents with    Hypertension    Medication Refill     HTN: Current regimen includes lisinopril 20 mg daily along with his CHF meds that include Toprol-XL 25 mg daily and Lasix 60 mg daily. BP in good range at home denies symptoms of hypertension or hypotension. CHF: Taking meds as prescribed. Drinks 1 gallon of fluid per day. All unsweetened beverages. He has been taking his Toprol-XL and Lasix as prescribed. Denies any chest pain, dyspnea on exertion, lower extremity edema. Leg wound: resolved. Morbid obesity: Patient has gained some weight as he has been off of his ketogenic diet recently. Extensive discussion held with regard to bariatric surgery. At this time, he does not have insurance so he will be unable to afford it. Health Maintenance Due   Topic Date Due    Varicella vaccine (1 of 2 - 2-dose childhood series) Never done    Pneumococcal 0-64 years Vaccine (1 of 2 - PPSV23) Never done    COVID-19 Vaccine (1) Never done    Flu vaccine (1) Never done     Past Medical History: CHF, hypertension, COPD, morbid obesity    Cancer History: None  Family Cancer History: None. Review of Systems    Review of Systems: All bolded are positive, all others are negative. General:  Fever, chills, diaphoresis, fatigue, malaise, night sweats, weight loss  Psychological:  Anxiety, disorientation, hallucinations. ENT:  Epistaxis, headaches, vertigo, visual changes. Cardiovascular:  Chest pain, irregular heartbeats, palpitations, paroxysmal nocturnal dyspnea. Respiratory:  Shortness of breath, coughing, sputum production, hemoptysis, wheezing, orthopnea.   Gastrointestinal:  Nausea, vomiting, diarrhea, heartburn, constipation, abdominal pain, hematemesis, hematochezia, melena, acholic stools  Genito-Urinary:  Dysuria, urgency, frequency, hematuria  Musculoskeletal:  Joint pain, joint stiffness, joint swelling, muscle pain  Neurology:  Headache, focal neurological deficits, weakness, numbness, paresthesia  Derm:  Rashes, ulcers, excoriations, bruising  Extremities:  Decreased ROM, peripheral edema, mottling      Objective:  Vitals:    10/29/21 0839   BP: 116/76   Pulse: 76   Temp: 97 °F (36.1 °C)   SpO2: 96%     Physical Exam  Constitutional:       General: He is not in acute distress. Appearance: Normal appearance. He is well-developed. He is obese. He is not ill-appearing, toxic-appearing or diaphoretic. HENT:      Head: Normocephalic and atraumatic. Right Ear: External ear normal.      Left Ear: External ear normal.      Nose: Nose normal.      Mouth/Throat:      Pharynx: No oropharyngeal exudate. Comments: Tongue with white plaque covering. Eyes:      General:         Right eye: No discharge. Left eye: No discharge. Conjunctiva/sclera: Conjunctivae normal.      Pupils: Pupils are equal, round, and reactive to light. Cardiovascular:      Rate and Rhythm: Normal rate and regular rhythm. Pulses: Normal pulses. Heart sounds: Normal heart sounds. No murmur heard. No friction rub. No gallop. Comments: Heart sounds distant due to body habitus. Pulmonary:      Effort: Pulmonary effort is normal. No respiratory distress. Breath sounds: No wheezing or rales. Comments: Breath sounds diminished due to body habitus. Abdominal:      General: Bowel sounds are normal. There is no distension. Palpations: Abdomen is soft. Tenderness: There is no abdominal tenderness. There is no guarding or rebound. Comments: Large pannus   Musculoskeletal:      Cervical back: Normal range of motion and neck supple. Lymphadenopathy:      Cervical: No cervical adenopathy. Neurological:      Mental Status: He is alert and oriented to person, place, and time. Psychiatric:         Behavior: Behavior normal.         Thought Content:  Thought content normal.         Judgment: Judgment normal.           Assessment and Plan:  Deana Benavides was seen today for hypertension and medication refill. Diagnoses and all orders for this visit:    Systolic and diastolic CHF w/reduced LV function, NYHA class 4 (HCC)  -     metoprolol succinate (TOPROL XL) 25 MG extended release tablet; Take 1 tablet by mouth daily  -     lisinopril (PRINIVIL;ZESTRIL) 20 MG tablet; Take 1 tablet by mouth daily  -     furosemide (LASIX) 40 MG tablet; Take 1 tablet by mouth daily  -     furosemide (LASIX) 20 MG tablet; TAKE ONE TABLET BY MOUTH EVERY DAY  -     Comprehensive Metabolic Panel; Future  -     CBC; Future  -     TSH without Reflex; Future  -     Lipid Panel; Future      HTN: Well-controlled on current regimen. Continue. CHF: Continue current regimen. We will need to collect labs including CBC, CMP to ensure renal function has not decreased due to Lasix use. Leg wound: Doxycycline 100 mg twice daily for 10 days. Morbid obesity: Collect CBC, CMP, TSH, lipid panel to check for sequelae of morbid obesity. Bariatric surgery referral once patient has insurance. Follow-up in 6 months    Patient may come in sooner if needed for medical concerns. Patient advised to call at any time to cancel or re-scheduleor for any questions/concerns. Please note that >15 minutes was spent face-to-face with the patient gathering history, performing physical exam, discussing findings, counseling the patient, and determining planforward. All questions and concerns addressed and answered.          Dru Baltazar,   10/29/21    12:50 PM

## 2022-01-31 DIAGNOSIS — I50.40 SYSTOLIC AND DIASTOLIC CHF W/REDUCED LV FUNCTION, NYHA CLASS 4 (HCC): ICD-10-CM

## 2022-01-31 RX ORDER — LISINOPRIL 20 MG/1
20 TABLET ORAL DAILY
Qty: 90 TABLET | Refills: 0 | Status: SHIPPED
Start: 2022-01-31 | End: 2022-04-27 | Stop reason: SDUPTHER

## 2022-01-31 RX ORDER — METOPROLOL SUCCINATE 25 MG/1
25 TABLET, EXTENDED RELEASE ORAL DAILY
Qty: 90 TABLET | Refills: 0 | Status: SHIPPED
Start: 2022-01-31 | End: 2022-04-27 | Stop reason: SDUPTHER

## 2022-01-31 RX ORDER — FUROSEMIDE 40 MG/1
40 TABLET ORAL DAILY
Qty: 90 TABLET | Refills: 0 | Status: SHIPPED
Start: 2022-01-31 | End: 2022-04-27 | Stop reason: SDUPTHER

## 2022-01-31 RX ORDER — FUROSEMIDE 20 MG/1
TABLET ORAL
Qty: 90 TABLET | Refills: 0 | Status: SHIPPED
Start: 2022-01-31 | End: 2022-04-27 | Stop reason: SDUPTHER

## 2022-01-31 NOTE — TELEPHONE ENCOUNTER
----- Message from Lida Austin sent at 1/31/2022 10:30 AM EST -----  Subject: Refill Request    QUESTIONS  Name of Medication? lisinopril (PRINIVIL;ZESTRIL) 20 MG tablet  Patient-reported dosage and instructions? 1 tablet daily  How many days do you have left? 0  Preferred Pharmacy? [facility] phone number (if available)? [phone]  Additional Information for Provider? Pt is requesting a 90 day supply of   meds. Pt has 0 days of rx left. Please send rx to pharmacy for pt.   ---------------------------------------------------------------------------  --------------,  Name of Medication? metoprolol succinate (TOPROL XL) 25 MG extended   release tablet  Patient-reported dosage and instructions? 1 tablet daily  How many days do you have left? 0  Preferred Pharmacy? [facility] phone number (if available)? [phone]  Additional Information for Provider? Pt is requesting a 90 day supply of   meds. Pt has 0 days of rx left. Please send rx to pharmacy for pt.   ---------------------------------------------------------------------------  --------------,  Name of Medication? furosemide (LASIX) 40 MG tablet  Patient-reported dosage and instructions? 1 tablet daily  How many days do you have left? 0  Preferred Pharmacy? [facility] phone number (if available)? [phone]  Additional Information for Provider? Pt is requesting a 90 day supply of   meds. Pt has 0 days of rx left. Please send rx to pharmacy for pt.   ---------------------------------------------------------------------------  --------------,  Name of Medication? furosemide (LASIX) 20 MG tablet  Patient-reported dosage and instructions? 1 tablet daily  How many days do you have left? 0  Preferred Pharmacy? [facility] phone number (if available)? [phone]  Additional Information for Provider? Pt is requesting a 90 day supply of   meds. Pt has 0 days of rx left.  Please send rx to pharmacy for pt.   ---------------------------------------------------------------------------  --------------  [address]  What is the best way for the office to contact you? OK to leave message on   voicemail  Preferred Call Back Phone Number?  [phone]

## 2022-04-27 DIAGNOSIS — I50.40 SYSTOLIC AND DIASTOLIC CHF W/REDUCED LV FUNCTION, NYHA CLASS 4 (HCC): ICD-10-CM

## 2022-04-27 RX ORDER — FUROSEMIDE 20 MG/1
TABLET ORAL
Qty: 90 TABLET | Refills: 0 | Status: SHIPPED
Start: 2022-04-27 | End: 2022-07-01 | Stop reason: SDUPTHER

## 2022-04-27 RX ORDER — LISINOPRIL 20 MG/1
20 TABLET ORAL DAILY
Qty: 90 TABLET | Refills: 0 | Status: SHIPPED
Start: 2022-04-27 | End: 2022-07-01 | Stop reason: SDUPTHER

## 2022-04-27 RX ORDER — METOPROLOL SUCCINATE 25 MG/1
25 TABLET, EXTENDED RELEASE ORAL DAILY
Qty: 90 TABLET | Refills: 0 | Status: SHIPPED
Start: 2022-04-27 | End: 2022-07-01 | Stop reason: SDUPTHER

## 2022-04-27 RX ORDER — FUROSEMIDE 40 MG/1
40 TABLET ORAL DAILY
Qty: 90 TABLET | Refills: 0 | Status: SHIPPED
Start: 2022-04-27 | End: 2022-07-01 | Stop reason: SDUPTHER

## 2022-07-01 ENCOUNTER — OFFICE VISIT (OUTPATIENT)
Dept: PRIMARY CARE CLINIC | Age: 32
End: 2022-07-01

## 2022-07-01 VITALS
BODY MASS INDEX: 40.43 KG/M2 | WEIGHT: 315 LBS | TEMPERATURE: 97.6 F | DIASTOLIC BLOOD PRESSURE: 84 MMHG | HEIGHT: 74 IN | SYSTOLIC BLOOD PRESSURE: 128 MMHG | HEART RATE: 94 BPM | OXYGEN SATURATION: 95 %

## 2022-07-01 DIAGNOSIS — L03.113 CELLULITIS OF RIGHT ARM: Primary | ICD-10-CM

## 2022-07-01 DIAGNOSIS — I50.40 SYSTOLIC AND DIASTOLIC CHF W/REDUCED LV FUNCTION, NYHA CLASS 4 (HCC): ICD-10-CM

## 2022-07-01 PROCEDURE — 99214 OFFICE O/P EST MOD 30 MIN: CPT | Performed by: FAMILY MEDICINE

## 2022-07-01 RX ORDER — LISINOPRIL 20 MG/1
20 TABLET ORAL DAILY
Qty: 90 TABLET | Refills: 0 | Status: SHIPPED
Start: 2022-07-01 | End: 2022-10-31 | Stop reason: SDUPTHER

## 2022-07-01 RX ORDER — FUROSEMIDE 20 MG/1
TABLET ORAL
Qty: 90 TABLET | Refills: 0 | Status: SHIPPED
Start: 2022-07-01 | End: 2022-10-31 | Stop reason: SDUPTHER

## 2022-07-01 RX ORDER — FUROSEMIDE 40 MG/1
40 TABLET ORAL DAILY
Qty: 90 TABLET | Refills: 0 | Status: SHIPPED
Start: 2022-07-01 | End: 2022-10-31 | Stop reason: SDUPTHER

## 2022-07-01 RX ORDER — DOXYCYCLINE HYCLATE 100 MG
100 TABLET ORAL 2 TIMES DAILY
Qty: 10 TABLET | Refills: 0 | Status: SHIPPED | OUTPATIENT
Start: 2022-07-01 | End: 2022-07-06

## 2022-07-01 RX ORDER — METOPROLOL SUCCINATE 25 MG/1
25 TABLET, EXTENDED RELEASE ORAL DAILY
Qty: 90 TABLET | Refills: 0 | Status: SHIPPED
Start: 2022-07-01 | End: 2022-10-31 | Stop reason: SDUPTHER

## 2022-07-01 SDOH — ECONOMIC STABILITY: FOOD INSECURITY: WITHIN THE PAST 12 MONTHS, YOU WORRIED THAT YOUR FOOD WOULD RUN OUT BEFORE YOU GOT MONEY TO BUY MORE.: NEVER TRUE

## 2022-07-01 SDOH — ECONOMIC STABILITY: FOOD INSECURITY: WITHIN THE PAST 12 MONTHS, THE FOOD YOU BOUGHT JUST DIDN'T LAST AND YOU DIDN'T HAVE MONEY TO GET MORE.: NEVER TRUE

## 2022-07-01 ASSESSMENT — PATIENT HEALTH QUESTIONNAIRE - PHQ9
SUM OF ALL RESPONSES TO PHQ QUESTIONS 1-9: 0
SUM OF ALL RESPONSES TO PHQ9 QUESTIONS 1 & 2: 0
1. LITTLE INTEREST OR PLEASURE IN DOING THINGS: 0
SUM OF ALL RESPONSES TO PHQ QUESTIONS 1-9: 0
SUM OF ALL RESPONSES TO PHQ QUESTIONS 1-9: 0
2. FEELING DOWN, DEPRESSED OR HOPELESS: 0
SUM OF ALL RESPONSES TO PHQ QUESTIONS 1-9: 0

## 2022-07-01 ASSESSMENT — SOCIAL DETERMINANTS OF HEALTH (SDOH): HOW HARD IS IT FOR YOU TO PAY FOR THE VERY BASICS LIKE FOOD, HOUSING, MEDICAL CARE, AND HEATING?: NOT HARD AT ALL

## 2022-07-01 NOTE — PROGRESS NOTES
Subjective:  34 y.o. male who presents to the office today with chief complaint:  Chief Complaint   Patient presents with   Samanta Raines a scooter and fell and hit his right arm      HTN: Taking lisinopril 20 mg daily in conjunction with his CHF medications that include Toprol-XL 25 mg daily and Lasix 60 mg daily. Checks BP at home. Reports BP readings at home are stable. Denies sensation of hyper/hypo tension. CHF: Taking Toprol-XL 25 mg daily and Lasix 60 mg daily. States he is doing well and taking medications as prescribed. Denies any chest pain, dyspnea, lower extremity edema. Class 3 obesity: Patient reports weight gained due to being off keto diet. He reports a girlfriend moved in for 5 months and work was stressful which caused an interruption with his diet regimen. He states he has returned to a keto diet in the past several weeks and trying his best to incorporate exercise. Fall: Patient states last weekend he fell off an electric scooter and landed on his R arm. He found a rx for amoxacillin at home and took it due to concern of infection. His arm is now swollen and painful, but ROM is intact. Denies fever, chills, purulent drainage from area. Health Maintenance Due   Topic Date Due    Varicella vaccine (1 of 2 - 2-dose childhood series) Never done    COVID-19 Vaccine (1) Never done    Pneumococcal 0-64 years Vaccine (1 - PCV) Never done    Depression Screen  04/09/2022    A1C test (Diabetic or Prediabetic)  04/17/2022     Past Medical History: CHF, hypertension, COPD, morbid obesity    Cancer History: None  Family Cancer History: None. Review of Systems    Review of Systems: All bolded are positive, all others are negative. General:  Fever, chills, diaphoresis, fatigue, malaise, night sweats, weight loss  Psychological:  Anxiety, disorientation, hallucinations. ENT:  Epistaxis, headaches, vertigo, visual changes.   Cardiovascular:  Chest pain, irregular heartbeats, palpitations, paroxysmal nocturnal dyspnea. Respiratory:  Shortness of breath, coughing, sputum production, hemoptysis, wheezing, orthopnea. Gastrointestinal:  Nausea, vomiting, diarrhea, heartburn, constipation, abdominal pain, hematemesis, hematochezia, melena, acholic stools  Genito-Urinary:  Dysuria, urgency, frequency, hematuria  Musculoskeletal:  Joint pain, joint stiffness, joint swelling, muscle pain  Neurology:  Headache, focal neurological deficits, weakness, numbness, paresthesia  Derm:  Rashes, ulcers, excoriations, bruising  Extremities:  Decreased ROM, peripheral edema, mottling      Objective:  Vitals:    07/01/22 1130   BP: 128/84   Pulse: 94   Temp: 97.6 °F (36.4 °C)   SpO2: 95%     Physical Exam  Constitutional:       General: He is not in acute distress. Appearance: Normal appearance. He is well-developed. He is obese. He is not ill-appearing, toxic-appearing or diaphoretic. HENT:      Head: Normocephalic and atraumatic. Right Ear: External ear normal.      Left Ear: External ear normal.      Nose: Nose normal.      Mouth/Throat:      Pharynx: No oropharyngeal exudate. Eyes:      General:         Right eye: No discharge. Left eye: No discharge. Conjunctiva/sclera: Conjunctivae normal.      Pupils: Pupils are equal, round, and reactive to light. Cardiovascular:      Rate and Rhythm: Normal rate and regular rhythm. Pulses: Normal pulses. Heart sounds: Normal heart sounds. No murmur heard. No friction rub. No gallop. Comments: Heart sounds distant due to body habitus. Pulmonary:      Effort: Pulmonary effort is normal. No respiratory distress. Breath sounds: No wheezing or rales. Comments: Breath sounds diminished due to body habitus. Abdominal:      General: Bowel sounds are normal. There is no distension. Palpations: Abdomen is soft. Tenderness: There is no abdominal tenderness. There is no guarding or rebound.       Comments: Large pannus   Musculoskeletal:      Right elbow: Swelling present. Tenderness present. Cervical back: Normal range of motion and neck supple. Comments: Multiple abrasions with surrounding erythema on R arm consistent with fall. Lymphadenopathy:      Cervical: No cervical adenopathy. Neurological:      Mental Status: He is alert and oriented to person, place, and time. Psychiatric:         Behavior: Behavior normal.         Thought Content: Thought content normal.         Judgment: Judgment normal.           Assessment and Plan:  Barbi Johnson was seen today for check-up. Diagnoses and all orders for this visit:    Cellulitis of right arm  -     doxycycline hyclate (VIBRA-TABS) 100 MG tablet; Take 1 tablet by mouth 2 times daily for 5 days    Systolic and diastolic CHF w/reduced LV function, NYHA class 4 (HCC)  -     metoprolol succinate (TOPROL XL) 25 MG extended release tablet; Take 1 tablet by mouth daily  -     lisinopril (PRINIVIL;ZESTRIL) 20 MG tablet; Take 1 tablet by mouth daily  -     furosemide (LASIX) 40 MG tablet; Take 1 tablet by mouth daily  -     furosemide (LASIX) 20 MG tablet; TAKE ONE TABLET BY MOUTH EVERY DAY      HTN: BP in excellent range today. Continue to monitor BP at home. Continue lisinopril 20 mg daily. CHF: Continue Toprol-XL 25 mg daily and Lasix 60 mg daily. Collect CBC/CMP to check kidney function. R arm cellulitis: Doxycycline 100 mg BID x 5 days. Patient to call if sxs do not improve or worsen. Morbid obesity: Collect CBC, CMP, TSH, lipid panel. Patient still self pay, but would like to consider bariatric surgery in the future once he has insurance. Lipid panel to monitor keto diet. Follow-up in 6 months    Patient may come in sooner if needed for medical concerns. Patient advised to call at any time to cancel or re-scheduleor for any questions/concerns.       Shonda Irizarry PA-C  7/1/22    12:17 PM

## 2022-07-05 ENCOUNTER — PATIENT MESSAGE (OUTPATIENT)
Dept: PRIMARY CARE CLINIC | Age: 32
End: 2022-07-05

## 2022-07-05 NOTE — TELEPHONE ENCOUNTER
PC to patient, per Dr. Ej Ram, advised patient to go to ER for assessment and treatment. Patient advised not to wait a couple days before going to ER.

## 2022-07-05 NOTE — TELEPHONE ENCOUNTER
From: Marlyn Campos  To: Dr. Margi Beck: 7/5/2022 12:07 AM EDT  Subject: My arm    My arm is getting more infected and bigger. You said if it's not getting better by today to let you know. I also looked back threw the my chart mary. The doxycycline is what didn't work last time on my leg. Then on 4/16/21 is when they took me off doxycycline and put me on cefdinir at the hospital. That's what I was talking about when I seen you but I couldn't remember what one didn't work on my leg. It's is more sore and swollen. It's still warm almost hot to touch and painful again.

## 2022-07-07 ENCOUNTER — HOSPITAL ENCOUNTER (EMERGENCY)
Age: 32
Discharge: HOME | End: 2022-07-07

## 2022-07-07 VITALS
DIASTOLIC BLOOD PRESSURE: 83 MMHG | TEMPERATURE: 98.5 F | SYSTOLIC BLOOD PRESSURE: 148 MMHG | OXYGEN SATURATION: 95 % | RESPIRATION RATE: 18 BRPM | HEART RATE: 87 BPM

## 2022-07-07 DIAGNOSIS — L03.113 CELLULITIS OF RIGHT FOREARM: Primary | ICD-10-CM

## 2022-07-07 PROCEDURE — 99283 EMERGENCY DEPT VISIT LOW MDM: CPT

## 2022-07-07 PROCEDURE — 6370000000 HC RX 637 (ALT 250 FOR IP): Performed by: PHYSICIAN ASSISTANT

## 2022-07-07 RX ORDER — CEPHALEXIN 250 MG/1
500 CAPSULE ORAL ONCE
Status: COMPLETED | OUTPATIENT
Start: 2022-07-07 | End: 2022-07-07

## 2022-07-07 RX ORDER — CEPHALEXIN 500 MG/1
500 CAPSULE ORAL 4 TIMES DAILY
Qty: 28 CAPSULE | Refills: 0 | Status: SHIPPED | OUTPATIENT
Start: 2022-07-07 | End: 2022-07-14

## 2022-07-07 RX ADMIN — CEPHALEXIN 500 MG: 250 CAPSULE ORAL at 13:14

## 2022-07-07 ASSESSMENT — LIFESTYLE VARIABLES: HOW OFTEN DO YOU HAVE A DRINK CONTAINING ALCOHOL: NEVER

## 2022-07-07 NOTE — ED PROVIDER NOTES
[facility]     Department of Emergency Medicine   ED  Encounter Note  Admit Date/RoomTime: 2022 12:31 PM  ED Room:     NAME: Norberto Love  : 1990  MRN: [id number]     Chief Complaint:  Arm Swelling (Left. Hx of cellulitis. )    History of Present Illness       Norberto Love is a 34 y.o. old male who presents to the emergency department by private vehicle, for gradual onset red, painful and swollen area on  Right forearm which began about a week prior to arrival.  The symptoms were caused by falling off of a scooter. Since onset the symptoms have been improving slightly with amoxicillin he had left over. He has had cutaneous abscess in the past.   His symptoms are associated with nothing additional.  He denies any f/c/s, n/v/d, chest pain, SOB, abdominal pain. ROS   Pertinent positives and negatives are stated within HPI, all other systems reviewed and are negative. Past Medical History:  has a past medical history of Cellulitis, Class 3 severe obesity due to excess calories without serious comorbidity with body mass index (BMI) of 45.0 to 49.9 in [facility]), COPD (chronic obstructive pulmonary disease) ([facility] [address] THOM (obstructive sleep apnea), and Systolic and diastolic CHF w/reduced LV function, [facility] Surgical History:  has a past surgical history that includes Tonsillectomy. Social History:  reports that he has never smoked. He has never used smokeless tobacco. He reports current alcohol use. He reports that he does not use drugs. Family History: family history includes Asthma in his maternal uncle; COPD in his paternal grandmother; Diabetes in his maternal grandmother; Heart Attack in his father; Heart Disease in his maternal grandmother.      Allergies: Sulfa antibiotics    Physical Exam   Oxygen Saturation Interpretation: Normal.        ED Triage Vitals   BP Temp Temp Source Heart Rate Resp SpO2 Height Weight   22 1228 22 1228 22 1228 22 1205 07/07/22 1205 07/07/22 1205 -- --   (!) 148/83 98.5 °F (36.9 °C) Oral (!) 112 (!) 1 95 %           Constitutional:  Alert, development consistent with age. HEENT:  NC/NT. Airway patent. Eyes:  No discharge. Ears:  External ears without lesions. Mouth:  Mucous membranes moist without lesions, tongue and gums normal.  Throat:  Airway patient. Neck:  Supple. No lymphadenopathy. Respiratory:  Clear to auscultation and breath sounds equal.  CV:  Regular rate and rhythm. Extremity:               Slight swelling to the right dorsal and proximal forearm with mild warmth and erythema. No fluctuance or localized swelling to suggest abscess. There are healing superficial abrasions as well. Radial pulses 2+ and equal bilaterally. Distal sensation and motor function equal bilaterally. Lymphatics: no lymphadenopathy noted  Neurological:  Orientation age-appropriate unless noted elseware. Motor functions intact. Lab / Imaging Results   (All laboratory and radiology results have been personally reviewed by myself)  Labs:  No results found for this visit on 07/07/22. Imaging: All Radiology results interpreted by Radiologist unless otherwise noted. No orders to display     ED Course / Medical Decision Making     Medications   cephALEXin (KEFLEX) capsule 500 mg (500 mg Oral Given 7/7/22 1314)      Consults:   None    Procedures:   none    MDM:   Patient presents to the emergency department for suspected right forearm infection. Physical exam consistent with cellulitis, but markedly low suspicion for abscess. Patient states he has been on a prescription for doxycycline without improvement of the area. Did state that he took a couple pills of leftover amoxicillin and believes that he had improvement from this. He will be switched from doxycycline to Keflex and should follow-up with his primary care provider. No additional constitutional symptoms to suggest systemic infection.   Patient hypertensive and morbidly obese, but vital signs are otherwise normal.  Patient well-appearing appropriate discharge and outpatient follow-up. Will be given the prescriptions below, patient educated on new meds. Return for any new or worsening symptoms. Plan of Care/Counseling:  Paulie Mancia PA-C reviewed today's visit with the patient in addition to providing specific details for the plan of care and counseling regarding the diagnosis and prognosis. Questions are answered at this time and are agreeable with the plan. Assessment      1. Cellulitis of right forearm      Plan   Discharged home. Patient condition is good    New Medications     Discharge Medication List as of 7/7/2022  1:21 PM      START taking these medications    Details   cephALEXin (KEFLEX) 500 MG capsule Take 1 capsule by mouth 4 times daily for 7 days, Disp-28 capsule, R-0Print           Electronically signed by Paulie Mancia PA-C   DD: 7/7/22  **This report was transcribed using voice recognition software. Every effort was made to ensure accuracy; however, inadvertent computerized transcription errors may be present.   END OF ED PROVIDER NOTE        Paulie Mancia PA-C  07/07/22 1540

## 2022-07-07 NOTE — Clinical Note
Angel Mario was seen and treated in our emergency department on 7/7/2022. He may return to work on 07/08/2022. If you have any questions or concerns, please don't hesitate to call.       Esther Lala PA-C

## 2022-10-28 DIAGNOSIS — I50.40 SYSTOLIC AND DIASTOLIC CHF W/REDUCED LV FUNCTION, NYHA CLASS 4 (HCC): ICD-10-CM

## 2022-10-31 RX ORDER — FUROSEMIDE 20 MG/1
TABLET ORAL
Qty: 90 TABLET | Refills: 1 | Status: SHIPPED | OUTPATIENT
Start: 2022-10-31

## 2022-10-31 RX ORDER — METOPROLOL SUCCINATE 25 MG/1
25 TABLET, EXTENDED RELEASE ORAL DAILY
Qty: 90 TABLET | Refills: 1 | Status: SHIPPED | OUTPATIENT
Start: 2022-10-31

## 2022-10-31 RX ORDER — LISINOPRIL 20 MG/1
20 TABLET ORAL DAILY
Qty: 90 TABLET | Refills: 1 | Status: SHIPPED | OUTPATIENT
Start: 2022-10-31 | End: 2023-01-29

## 2022-10-31 RX ORDER — FUROSEMIDE 40 MG/1
40 TABLET ORAL DAILY
Qty: 90 TABLET | Refills: 1 | Status: SHIPPED | OUTPATIENT
Start: 2022-10-31

## 2023-01-25 ENCOUNTER — TELEPHONE (OUTPATIENT)
Dept: PRIMARY CARE CLINIC | Age: 33
End: 2023-01-25

## 2023-01-25 DIAGNOSIS — I50.40 SYSTOLIC AND DIASTOLIC CHF W/REDUCED LV FUNCTION, NYHA CLASS 4 (HCC): ICD-10-CM

## 2023-01-25 NOTE — TELEPHONE ENCOUNTER
Patient requesting    Furosemide 20 mg (with the 40 mg) #90  Si qd    Furosemide 40 mg (with the 20 mg)  #90  Si qd    Lisinopril 20 mg #90  Si qd    Metoprolol Succinate 25 mg #90  Si qd        [facility] ([place]

## 2023-01-27 RX ORDER — LISINOPRIL 20 MG/1
20 TABLET ORAL DAILY
Qty: 90 TABLET | Refills: 1 | Status: SHIPPED | OUTPATIENT
Start: 2023-01-27 | End: 2023-04-27

## 2023-01-27 RX ORDER — METOPROLOL SUCCINATE 25 MG/1
25 TABLET, EXTENDED RELEASE ORAL DAILY
Qty: 90 TABLET | Refills: 1 | Status: SHIPPED | OUTPATIENT
Start: 2023-01-27

## 2023-01-27 RX ORDER — FUROSEMIDE 40 MG/1
40 TABLET ORAL DAILY
Qty: 90 TABLET | Refills: 1 | Status: SHIPPED | OUTPATIENT
Start: 2023-01-27

## 2023-01-27 RX ORDER — FUROSEMIDE 20 MG/1
TABLET ORAL
Qty: 90 TABLET | Refills: 1 | Status: SHIPPED | OUTPATIENT
Start: 2023-01-27

## 2023-03-10 ENCOUNTER — OFFICE VISIT (OUTPATIENT)
Dept: PRIMARY CARE CLINIC | Age: 33
End: 2023-03-10

## 2023-03-10 VITALS
SYSTOLIC BLOOD PRESSURE: 128 MMHG | TEMPERATURE: 97.7 F | HEART RATE: 106 BPM | OXYGEN SATURATION: 93 % | RESPIRATION RATE: 18 BRPM | DIASTOLIC BLOOD PRESSURE: 94 MMHG | WEIGHT: 315 LBS | HEIGHT: 74 IN | BODY MASS INDEX: 40.43 KG/M2

## 2023-03-10 DIAGNOSIS — E66.01 MORBID OBESITY WITH BMI OF 50.0-59.9, ADULT (HCC): Primary | ICD-10-CM

## 2023-03-10 DIAGNOSIS — I10 ESSENTIAL HYPERTENSION: ICD-10-CM

## 2023-03-10 DIAGNOSIS — I50.40 SYSTOLIC AND DIASTOLIC CHF W/REDUCED LV FUNCTION, NYHA CLASS 4 (HCC): ICD-10-CM

## 2023-03-10 LAB
ALBUMIN SERPL-MCNC: 4.1 G/DL (ref 3.5–5.2)
ALP BLD-CCNC: 57 U/L (ref 40–129)
ALT SERPL-CCNC: 30 U/L (ref 0–40)
ANION GAP SERPL CALCULATED.3IONS-SCNC: 18 MMOL/L (ref 7–16)
AST SERPL-CCNC: 30 U/L (ref 0–39)
BILIRUB SERPL-MCNC: 0.3 MG/DL (ref 0–1.2)
BUN BLDV-MCNC: 12 MG/DL (ref 6–20)
CALCIUM SERPL-MCNC: 9 MG/DL (ref 8.6–10.2)
CHLORIDE BLD-SCNC: 101 MMOL/L (ref 98–107)
CHOLESTEROL, TOTAL: 118 MG/DL (ref 0–199)
CO2: 20 MMOL/L (ref 22–29)
CREAT SERPL-MCNC: 0.7 MG/DL (ref 0.7–1.2)
GFR SERPL CREATININE-BSD FRML MDRD: >60 ML/MIN/1.73
GLUCOSE BLD-MCNC: 85 MG/DL (ref 74–99)
HCT VFR BLD CALC: 40.4 % (ref 37–54)
HDLC SERPL-MCNC: 42 MG/DL
HEMOGLOBIN: 12.3 G/DL (ref 12.5–16.5)
LDL CHOLESTEROL CALCULATED: 64 MG/DL (ref 0–99)
MCH RBC QN AUTO: 27.3 PG (ref 26–35)
MCHC RBC AUTO-ENTMCNC: 30.4 % (ref 32–34.5)
MCV RBC AUTO: 89.6 FL (ref 80–99.9)
PDW BLD-RTO: 15 FL (ref 11.5–15)
PLATELET # BLD: 326 E9/L (ref 130–450)
PMV BLD AUTO: 10.4 FL (ref 7–12)
POTASSIUM SERPL-SCNC: 4.3 MMOL/L (ref 3.5–5)
RBC # BLD: 4.51 E12/L (ref 3.8–5.8)
SODIUM BLD-SCNC: 139 MMOL/L (ref 132–146)
TOTAL PROTEIN: 7.5 G/DL (ref 6.4–8.3)
TRIGL SERPL-MCNC: 59 MG/DL (ref 0–149)
TSH SERPL DL<=0.05 MIU/L-ACNC: 0.99 UIU/ML (ref 0.27–4.2)
VLDLC SERPL CALC-MCNC: 12 MG/DL
WBC # BLD: 7.2 E9/L (ref 4.5–11.5)

## 2023-03-10 PROCEDURE — 3080F DIAST BP >= 90 MM HG: CPT | Performed by: FAMILY MEDICINE

## 2023-03-10 PROCEDURE — 99214 OFFICE O/P EST MOD 30 MIN: CPT | Performed by: FAMILY MEDICINE

## 2023-03-10 PROCEDURE — 3074F SYST BP LT 130 MM HG: CPT | Performed by: FAMILY MEDICINE

## 2023-03-10 SDOH — ECONOMIC STABILITY: INCOME INSECURITY: HOW HARD IS IT FOR YOU TO PAY FOR THE VERY BASICS LIKE FOOD, HOUSING, MEDICAL CARE, AND HEATING?: NOT HARD AT ALL

## 2023-03-10 SDOH — ECONOMIC STABILITY: FOOD INSECURITY: WITHIN THE PAST 12 MONTHS, YOU WORRIED THAT YOUR FOOD WOULD RUN OUT BEFORE YOU GOT MONEY TO BUY MORE.: NEVER TRUE

## 2023-03-10 SDOH — ECONOMIC STABILITY: FOOD INSECURITY: WITHIN THE PAST 12 MONTHS, THE FOOD YOU BOUGHT JUST DIDN'T LAST AND YOU DIDN'T HAVE MONEY TO GET MORE.: NEVER TRUE

## 2023-03-10 SDOH — ECONOMIC STABILITY: HOUSING INSECURITY
IN THE LAST 12 MONTHS, WAS THERE A TIME WHEN YOU DID NOT HAVE A STEADY PLACE TO SLEEP OR SLEPT IN A SHELTER (INCLUDING NOW)?: NO

## 2023-03-10 ASSESSMENT — PATIENT HEALTH QUESTIONNAIRE - PHQ9
1. LITTLE INTEREST OR PLEASURE IN DOING THINGS: 0
SUM OF ALL RESPONSES TO PHQ QUESTIONS 1-9: 0
2. FEELING DOWN, DEPRESSED OR HOPELESS: 0
SUM OF ALL RESPONSES TO PHQ9 QUESTIONS 1 & 2: 0
SUM OF ALL RESPONSES TO PHQ QUESTIONS 1-9: 0

## 2023-03-10 NOTE — PROGRESS NOTES
Subjective:  34 y.o. male who presents to the office today with chief complaint:  Chief Complaint   Patient presents with    6 Month Follow-Up     HTN, CHF, Obesity     HTN: Current regimen includes lisinopril 20 mg daily along with his CHF meds that include Toprol-XL 25 mg daily and Lasix 60 mg daily. BP in good range at home denies symptoms of hypertension or hypotension. CHF: Patient currently is on an ACE, beta-blocker, loop diuretic. Denies symptoms of fluid overload including chest pain, edema, shortness of breath. Drinks 6-8 bottles of water per day    Morbid obesity: Extensive discussion held today regarding patient's weight. He has gained nearly 100 pounds in the last 2 years. States that he recently restarted keto diet which has helped him have success in the past.  Currently, he has no health insurance so he is unable to afford bariatric surgery. Patient states that he is considering changing jobs to allow time to get insurance, which would allow him to have a weight loss procedure completed. Health Maintenance Due   Topic Date Due    COVID-19 Vaccine (1) Never done    Varicella vaccine (1 of 2 - 2-dose childhood series) Never done    Pneumococcal 0-64 years Vaccine (1 - PCV) Never done    A1C test (Diabetic or Prediabetic)  04/17/2022    Flu vaccine (1) Never done     Past Medical History: CHF, hypertension, COPD, morbid obesity    Cancer History: None  Family Cancer History: None. Review of Systems    Review of Systems: All bolded are positive, all others are negative. General:  Fever, chills, diaphoresis, fatigue, malaise, night sweats, weight loss  Psychological:  Anxiety, disorientation, hallucinations. ENT:  Epistaxis, headaches, vertigo, visual changes. Cardiovascular:  Chest pain, irregular heartbeats, palpitations, paroxysmal nocturnal dyspnea. Respiratory:  Shortness of breath, coughing, sputum production, hemoptysis, wheezing, orthopnea.   Gastrointestinal:  Nausea, vomiting, diarrhea, heartburn, constipation, abdominal pain, hematemesis, hematochezia, melena, acholic stools  Genito-Urinary:  Dysuria, urgency, frequency, hematuria  Musculoskeletal:  Joint pain, joint stiffness, joint swelling, muscle pain  Neurology:  Headache, focal neurological deficits, weakness, numbness, paresthesia  Derm:  Rashes, ulcers, excoriations, bruising  Extremities:  Decreased ROM, peripheral edema, mottling      Objective:  Vitals:    03/10/23 0901   BP: (!) 128/94   Pulse: (!) 106   Resp: 18   Temp: 97.7 °F (36.5 °C)   SpO2: 93%     Physical Exam  Constitutional:       General: He is not in acute distress. Appearance: Normal appearance. He is well-developed. He is obese. He is not ill-appearing, toxic-appearing or diaphoretic. HENT:      Head: Normocephalic and atraumatic. Right Ear: External ear normal.      Left Ear: External ear normal.      Nose: Nose normal.      Mouth/Throat:      Pharynx: No oropharyngeal exudate. Eyes:      General:         Right eye: No discharge. Left eye: No discharge. Conjunctiva/sclera: Conjunctivae normal.      Pupils: Pupils are equal, round, and reactive to light. Cardiovascular:      Rate and Rhythm: Normal rate and regular rhythm. Pulses: Normal pulses. Heart sounds: Normal heart sounds. No murmur heard. No friction rub. No gallop. Comments: Heart sounds distant due to body habitus. Pulmonary:      Effort: Pulmonary effort is normal. No respiratory distress. Breath sounds: No wheezing or rales. Comments: Breath sounds diminished due to body habitus. Abdominal:      General: Bowel sounds are normal. There is no distension. Palpations: Abdomen is soft. Tenderness: There is no abdominal tenderness. There is no guarding or rebound. Comments: Pendulous abdomen   Musculoskeletal:      Cervical back: Normal range of motion and neck supple.    Lymphadenopathy:      Cervical: No cervical adenopathy.   Neurological:      Mental Status: He is alert and oriented to person, place, and time.   Psychiatric:         Behavior: Behavior normal.         Thought Content: Thought content normal.         Judgment: Judgment normal.         Assessment and Plan:  Harvinder was seen today for 6 month follow-up.    Diagnoses and all orders for this visit:    Essential hypertension  -     Comprehensive Metabolic Panel; Future  -     CBC; Future  -     TSH; Future  -     Lipid Panel; Future    HTN: Blood pressure mildly elevated today.  Should continue to decrease with weight loss.    CHF: Continue current regimen.  Collect labs including CBC, CMP, TSH, lipid panel.  Currently, patient is compensated from a cardiovascular standpoint    Morbid obesity: Bariatric surgery referral once patient has insurance.      Follow-up in 6 months    Patient may come in sooner if needed for medical concerns.     Patient advised to call at any time to cancel, re-schedule, or for any questions/concerns.    3/10/23    12:08 PM

## 2023-05-05 DIAGNOSIS — I50.40 SYSTOLIC AND DIASTOLIC CHF W/REDUCED LV FUNCTION, NYHA CLASS 4 (HCC): ICD-10-CM

## 2023-05-05 RX ORDER — FUROSEMIDE 40 MG/1
40 TABLET ORAL DAILY
Qty: 90 TABLET | Refills: 1 | Status: SHIPPED | OUTPATIENT
Start: 2023-05-05

## 2023-05-05 RX ORDER — FUROSEMIDE 20 MG/1
TABLET ORAL
Qty: 90 TABLET | Refills: 1 | Status: SHIPPED | OUTPATIENT
Start: 2023-05-05

## 2023-05-05 RX ORDER — METOPROLOL SUCCINATE 25 MG/1
25 TABLET, EXTENDED RELEASE ORAL DAILY
Qty: 90 TABLET | Refills: 1 | Status: SHIPPED | OUTPATIENT
Start: 2023-05-05

## 2023-05-05 RX ORDER — LISINOPRIL 20 MG/1
20 TABLET ORAL DAILY
Qty: 90 TABLET | Refills: 1 | Status: SHIPPED | OUTPATIENT
Start: 2023-05-05 | End: 2023-08-03

## 2023-07-20 DIAGNOSIS — I50.40 SYSTOLIC AND DIASTOLIC CHF W/REDUCED LV FUNCTION, NYHA CLASS 4 (HCC): ICD-10-CM

## 2023-07-20 RX ORDER — FUROSEMIDE 20 MG/1
TABLET ORAL
Qty: 90 TABLET | Refills: 1 | Status: SHIPPED | OUTPATIENT
Start: 2023-07-20

## 2023-07-20 RX ORDER — LISINOPRIL 20 MG/1
20 TABLET ORAL DAILY
Qty: 90 TABLET | Refills: 1 | Status: SHIPPED | OUTPATIENT
Start: 2023-07-20 | End: 2023-10-18

## 2023-07-20 RX ORDER — METOPROLOL SUCCINATE 25 MG/1
25 TABLET, EXTENDED RELEASE ORAL DAILY
Qty: 90 TABLET | Refills: 1 | Status: SHIPPED | OUTPATIENT
Start: 2023-07-20

## 2023-07-20 RX ORDER — FUROSEMIDE 40 MG/1
40 TABLET ORAL DAILY
Qty: 90 TABLET | Refills: 1 | Status: SHIPPED | OUTPATIENT
Start: 2023-07-20

## 2023-09-08 ENCOUNTER — TELEPHONE (OUTPATIENT)
Dept: PRIMARY CARE CLINIC | Age: 33
End: 2023-09-08

## 2023-09-08 NOTE — TELEPHONE ENCOUNTER
The following message was sent to patient in [address] [place] [address] after receiving cancel message. Attempted to call patient as well and left a VM asking patient to contact the office to reschedule. Nicol Mattson,    We are sorry to hear you could not make your appointment today. We tried to reach out to you to reschedule but were unable to reach you. We ask that you please contact the office so that your 6 month appointment can be rescheduled with Daily Bahena to avoid any lapse in care. We can be reached at [phone]. You may also book through your [employer] account, however it may be easier to schedule directly with the office as Dr. Blanco Sorto, your current primary care physician, is no longer with the practice. Thank you so much!  ===View-only below this line===      ----- Message -----       From:Harvinder العراقي \"Don\"       Sent:9/7/2023  5:46 AM EDT         To:[address] canceled    Appointment canceled for Greta Barba ([id number])  Visit Type: OFFICE VISIT  Date        Time      Length    Provider                  Department  9/8/2023     3:00 PM  30 mins.   Daily Bahena PA-C      [facility]    Reason for Cancellation: Other

## 2023-10-25 ENCOUNTER — TELEPHONE (OUTPATIENT)
Dept: PRIMARY CARE CLINIC | Age: 33
End: 2023-10-25

## 2023-10-25 NOTE — TELEPHONE ENCOUNTER
PC to patient to follow up.   Patient was due for 6 mo check up last month but had cancelled appointment with Giuliana Mckee    Was able to reach patient, appointment rescheduled for 11/17/23    Patient aware it is with Toby Lockwood PA-C

## 2023-12-08 ENCOUNTER — OFFICE VISIT (OUTPATIENT)
Dept: PRIMARY CARE CLINIC | Age: 33
End: 2023-12-08

## 2023-12-08 VITALS
WEIGHT: 315 LBS | HEIGHT: 74 IN | TEMPERATURE: 97.9 F | DIASTOLIC BLOOD PRESSURE: 86 MMHG | BODY MASS INDEX: 40.43 KG/M2 | SYSTOLIC BLOOD PRESSURE: 118 MMHG | HEART RATE: 88 BPM

## 2023-12-08 DIAGNOSIS — I50.40 SYSTOLIC AND DIASTOLIC CHF W/REDUCED LV FUNCTION, NYHA CLASS 4 (HCC): ICD-10-CM

## 2023-12-08 DIAGNOSIS — Z59.89 DOES NOT HAVE HEALTH INSURANCE: ICD-10-CM

## 2023-12-08 DIAGNOSIS — I10 ESSENTIAL HYPERTENSION: ICD-10-CM

## 2023-12-08 DIAGNOSIS — Z13.1 SCREENING FOR DIABETES MELLITUS: Primary | ICD-10-CM

## 2023-12-08 DIAGNOSIS — I50.22 CHRONIC SYSTOLIC CONGESTIVE HEART FAILURE (HCC): ICD-10-CM

## 2023-12-08 LAB — HBA1C MFR BLD: 5.9 %

## 2023-12-08 PROCEDURE — 83036 HEMOGLOBIN GLYCOSYLATED A1C: CPT

## 2023-12-08 PROCEDURE — 3074F SYST BP LT 130 MM HG: CPT

## 2023-12-08 PROCEDURE — 3079F DIAST BP 80-89 MM HG: CPT

## 2023-12-08 PROCEDURE — 99214 OFFICE O/P EST MOD 30 MIN: CPT

## 2023-12-08 RX ORDER — METOPROLOL SUCCINATE 25 MG/1
25 TABLET, EXTENDED RELEASE ORAL DAILY
Qty: 90 TABLET | Refills: 3 | Status: SHIPPED | OUTPATIENT
Start: 2023-12-08

## 2023-12-08 RX ORDER — FUROSEMIDE 20 MG/1
TABLET ORAL
Qty: 90 TABLET | Refills: 3 | Status: SHIPPED | OUTPATIENT
Start: 2023-12-08

## 2023-12-08 RX ORDER — LISINOPRIL 20 MG/1
20 TABLET ORAL DAILY
Qty: 90 TABLET | Refills: 3 | Status: SHIPPED | OUTPATIENT
Start: 2023-12-08 | End: 2024-12-02

## 2023-12-08 RX ORDER — FUROSEMIDE 40 MG/1
40 TABLET ORAL DAILY
Qty: 90 TABLET | Refills: 3 | Status: SHIPPED | OUTPATIENT
Start: 2023-12-08

## 2023-12-08 SDOH — ECONOMIC STABILITY - INCOME SECURITY: OTHER PROBLEMS RELATED TO HOUSING AND ECONOMIC CIRCUMSTANCES: Z59.89

## 2023-12-08 ASSESSMENT — ENCOUNTER SYMPTOMS
COUGH: 0
VOMITING: 0
PHOTOPHOBIA: 0
ABDOMINAL PAIN: 0
DIARRHEA: 0
BACK PAIN: 0
SHORTNESS OF BREATH: 0
CONSTIPATION: 0
RHINORRHEA: 0
WHEEZING: 0
SORE THROAT: 0

## 2023-12-08 NOTE — PROGRESS NOTES
Future    Chronic systolic congestive heart failure (HCC)  -     CBC with Auto Differential; Future  -     Comprehensive Metabolic Panel; Future  -     Lipid Panel; Future  -     TSH; Future    Does not have health insurance  -     [facility] Referral to Social Work (Primary Care Only)    Systolic and diastolic CHF w/reduced LV function, NYHA class 4 (HCC)  -     furosemide (LASIX) 20 MG tablet; TAKE ONE TABLET BY MOUTH EVERY DAY  -     lisinopril (PRINIVIL;ZESTRIL) 20 MG tablet; Take 1 tablet by mouth daily  -     metoprolol succinate (TOPROL XL) 25 MG extended release tablet; Take 1 tablet by mouth daily  -     furosemide (LASIX) 40 MG tablet; Take 1 tablet by mouth daily      Collect routine labs as above. Referral sent to [employer] work to hopefully help patient establish some health insurance. Hopefully with some insurance can then discuss possible bariatric referral.    Hypertension/CHF: Continue Lasix 60 mg daily, lisinopril 20 mg daily, and metoprolol 25 mg daily. BP in good range today. No signs of fluid overload. Denies CP SOB. A1c 5.9 today. Monitor in future. Most likely would go down with weight loss. Patient may come in sooner if needed for medical concerns. Patient advised to call at any time to cancel, re-schedule, or for any questions/concerns.     12/8/23 Helical Rim Text: The closure involved the helical rim.

## 2023-12-11 ENCOUNTER — SOCIAL WORK (OUTPATIENT)
Dept: FAMILY MEDICINE CLINIC | Age: 33
End: 2023-12-11

## 2023-12-11 NOTE — PROGRESS NOTES
LSW initiated first phone call to patient on 12.11.23 at 1:30pm regarding referral to Social Work Department. LSW was unable to make contact with pt but was able to leave a voicemail for pt with reason for call and contact information. LSW will continue to attempt contact with pt.    Reason: No healthcare coverage

## 2023-12-14 ENCOUNTER — TELEPHONE (OUTPATIENT)
Dept: FAMILY MEDICINE CLINIC | Age: 33
End: 2023-12-14

## 2023-12-14 NOTE — TELEPHONE ENCOUNTER
LSW initiated second phone call to patient on 12.14.23 at 1:15pm regarding referral to Social Work Department. LSW was unable to make contact with pt but was able to leave a voicemail for pt with reason for call and contact information. LSW will continue to attempt contact with pt. Reason: No healthcare coverage    If pt reaches out to office, please have him reach out to [facility] regarding referral to Social Work Department.

## 2024-01-30 DIAGNOSIS — I50.40 SYSTOLIC AND DIASTOLIC CHF W/REDUCED LV FUNCTION, NYHA CLASS 4 (HCC): ICD-10-CM

## 2024-01-31 RX ORDER — METOPROLOL SUCCINATE 25 MG/1
25 TABLET, EXTENDED RELEASE ORAL DAILY
Qty: 90 TABLET | Refills: 3 | OUTPATIENT
Start: 2024-01-31

## 2024-01-31 RX ORDER — FUROSEMIDE 40 MG/1
40 TABLET ORAL DAILY
Qty: 90 TABLET | Refills: 3 | OUTPATIENT
Start: 2024-01-31

## 2024-01-31 RX ORDER — LISINOPRIL 20 MG/1
20 TABLET ORAL DAILY
Qty: 90 TABLET | Refills: 3 | OUTPATIENT
Start: 2024-01-31 | End: 2025-01-25

## 2024-01-31 RX ORDER — FUROSEMIDE 20 MG/1
TABLET ORAL
Qty: 90 TABLET | Refills: 3 | OUTPATIENT
Start: 2024-01-31

## 2024-01-31 NOTE — TELEPHONE ENCOUNTER
Pt notified Rx sent to pharmacy on 12/8/23 90 day supply with 3 refills.    Pt voiced understanding and will check with pharmacy.

## 2024-06-11 SDOH — ECONOMIC STABILITY: FOOD INSECURITY: WITHIN THE PAST 12 MONTHS, THE FOOD YOU BOUGHT JUST DIDN'T LAST AND YOU DIDN'T HAVE MONEY TO GET MORE.: PATIENT DECLINED

## 2024-06-11 SDOH — ECONOMIC STABILITY: FOOD INSECURITY: WITHIN THE PAST 12 MONTHS, YOU WORRIED THAT YOUR FOOD WOULD RUN OUT BEFORE YOU GOT MONEY TO BUY MORE.: PATIENT DECLINED

## 2024-06-11 SDOH — ECONOMIC STABILITY: INCOME INSECURITY: HOW HARD IS IT FOR YOU TO PAY FOR THE VERY BASICS LIKE FOOD, HOUSING, MEDICAL CARE, AND HEATING?: PATIENT DECLINED

## 2024-06-11 SDOH — ECONOMIC STABILITY: HOUSING INSECURITY
IN THE LAST 12 MONTHS, WAS THERE A TIME WHEN YOU DID NOT HAVE A STEADY PLACE TO SLEEP OR SLEPT IN A SHELTER (INCLUDING NOW)?: PATIENT DECLINED

## 2024-06-11 SDOH — ECONOMIC STABILITY: TRANSPORTATION INSECURITY
IN THE PAST 12 MONTHS, HAS LACK OF TRANSPORTATION KEPT YOU FROM MEETINGS, WORK, OR FROM GETTING THINGS NEEDED FOR DAILY LIVING?: PATIENT DECLINED

## 2024-06-11 ASSESSMENT — PATIENT HEALTH QUESTIONNAIRE - PHQ9
2. FEELING DOWN, DEPRESSED OR HOPELESS: NOT AT ALL
1. LITTLE INTEREST OR PLEASURE IN DOING THINGS: NOT AT ALL
SUM OF ALL RESPONSES TO PHQ QUESTIONS 1-9: 0
2. FEELING DOWN, DEPRESSED OR HOPELESS: NOT AT ALL
1. LITTLE INTEREST OR PLEASURE IN DOING THINGS: NOT AT ALL
SUM OF ALL RESPONSES TO PHQ9 QUESTIONS 1 & 2: 0
SUM OF ALL RESPONSES TO PHQ9 QUESTIONS 1 & 2: 0
SUM OF ALL RESPONSES TO PHQ QUESTIONS 1-9: 0

## 2024-06-14 ENCOUNTER — OFFICE VISIT (OUTPATIENT)
Dept: PRIMARY CARE CLINIC | Age: 34
End: 2024-06-14

## 2024-06-14 VITALS
SYSTOLIC BLOOD PRESSURE: 134 MMHG | HEIGHT: 74 IN | WEIGHT: 315 LBS | OXYGEN SATURATION: 94 % | HEART RATE: 88 BPM | DIASTOLIC BLOOD PRESSURE: 86 MMHG | TEMPERATURE: 96.8 F | BODY MASS INDEX: 40.43 KG/M2

## 2024-06-14 DIAGNOSIS — R73.9 HYPERGLYCEMIA: ICD-10-CM

## 2024-06-14 DIAGNOSIS — Z59.89 DOES NOT HAVE HEALTH INSURANCE: ICD-10-CM

## 2024-06-14 DIAGNOSIS — I50.22 CHRONIC SYSTOLIC CONGESTIVE HEART FAILURE (HCC): ICD-10-CM

## 2024-06-14 DIAGNOSIS — I10 ESSENTIAL HYPERTENSION: Primary | ICD-10-CM

## 2024-06-14 LAB — HBA1C MFR BLD: 6.1 %

## 2024-06-14 PROCEDURE — 83036 HEMOGLOBIN GLYCOSYLATED A1C: CPT

## 2024-06-14 PROCEDURE — 3075F SYST BP GE 130 - 139MM HG: CPT

## 2024-06-14 PROCEDURE — 99214 OFFICE O/P EST MOD 30 MIN: CPT

## 2024-06-14 PROCEDURE — 3079F DIAST BP 80-89 MM HG: CPT

## 2024-06-14 SDOH — ECONOMIC STABILITY - INCOME SECURITY: OTHER PROBLEMS RELATED TO HOUSING AND ECONOMIC CIRCUMSTANCES: Z59.89

## 2024-06-14 NOTE — PROGRESS NOTES
Subjective:  34 y.o. male who presents to the office today with chief complaint:  Chief Complaint   Patient presents with    6 Month Follow-Up     Routine  Poct A1C -      Patient here for routine 6-month follow-up    Was not able to obtain any health insurance.  Was previously referred to social work    Hypertension/CHF: Currently on lisinopril 20 mg daily, Lasix 60 mg daily, and metoprolol 25 mg daily.  No complaints of any recent pulmonary/cardiac symptoms.  Denies SOB and CP     Prediabetic: Last A1c was 5.9.  Today is 6.1    Past Medical History:   Diagnosis Date    Cellulitis     Leg    Class 3 severe obesity due to excess calories without serious comorbidity with body mass index (BMI) of 45.0 to 49.9 in adult (HCC)     COPD (chronic obstructive pulmonary disease) ([facility])     THOM (obstructive sleep apnea)     Systolic and diastolic CHF w/reduced LV function, NYHA class 4 ([facility])      Past Surgical History:   Procedure Laterality Date    TONSILLECTOMY       Current Outpatient Medications   Medication Sig Dispense Refill    furosemide (LASIX) 20 MG tablet TAKE ONE TABLET BY MOUTH EVERY DAY 90 tablet 3    lisinopril (PRINIVIL;ZESTRIL) 20 MG tablet Take 1 tablet by mouth daily 90 tablet 3    metoprolol succinate (TOPROL XL) 25 MG extended release tablet Take 1 tablet by mouth daily 90 tablet 3    furosemide (LASIX) 40 MG tablet Take 1 tablet by mouth daily 90 tablet 3     No current facility-administered medications for this visit.     Health Maintenance Due   Topic Date Due    Hepatitis B vaccine (1 of 3 - 3-dose series) Never done    COVID-19 Vaccine (1) Never done    Varicella vaccine (1 of 2 - 2-dose childhood series) Never done    Pneumococcal 0-64 years Vaccine (1 of 2 - PCV) Never done     Past Medical History: CHF, hypertension, COPD, obesity  Cancer History: None  Family Cancer History: None.    Review of Systems   Constitutional:  Negative for appetite change, chills, diaphoresis, fatigue and fever.   HENT:

## 2024-06-18 ENCOUNTER — TELEPHONE (OUTPATIENT)
Dept: FAMILY MEDICINE CLINIC | Age: 34
End: 2024-06-18

## 2024-06-18 NOTE — TELEPHONE ENCOUNTER
LSW initiated first phone call to patient on 6.18.24 at 3:15pm regarding referral to Social Work Department. LSW was unable to make contact with pt but was able to leave a voicemail for pt with reason for call and contact information. LSW will continue to attempt contact with pt.

## 2024-07-30 DIAGNOSIS — I50.40 SYSTOLIC AND DIASTOLIC CHF W/REDUCED LV FUNCTION, NYHA CLASS 4 (HCC): ICD-10-CM

## 2024-07-30 RX ORDER — LISINOPRIL 20 MG/1
20 TABLET ORAL DAILY
Qty: 90 TABLET | Refills: 3 | OUTPATIENT
Start: 2024-07-30 | End: 2025-07-25

## 2024-07-30 RX ORDER — FUROSEMIDE 40 MG/1
40 TABLET ORAL DAILY
Qty: 90 TABLET | Refills: 3 | OUTPATIENT
Start: 2024-07-30

## 2024-07-30 RX ORDER — METOPROLOL SUCCINATE 25 MG/1
25 TABLET, EXTENDED RELEASE ORAL DAILY
Qty: 90 TABLET | Refills: 3 | OUTPATIENT
Start: 2024-07-30

## 2024-07-30 RX ORDER — FUROSEMIDE 20 MG/1
TABLET ORAL
Qty: 90 TABLET | Refills: 3 | OUTPATIENT
Start: 2024-07-30

## 2024-08-13 ENCOUNTER — TELEPHONE (OUTPATIENT)
Dept: PRIMARY CARE CLINIC | Age: 34
End: 2024-08-13
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
## 2024-08-18 ENCOUNTER — HOSPITAL ENCOUNTER (EMERGENCY)
Age: 34
Discharge: HOME | End: 2024-08-18

## 2024-08-18 VITALS
TEMPERATURE: 97.9 F | WEIGHT: 315 LBS | RESPIRATION RATE: 20 BRPM | HEIGHT: 74 IN | HEART RATE: 66 BPM | BODY MASS INDEX: 40.43 KG/M2 | OXYGEN SATURATION: 99 % | SYSTOLIC BLOOD PRESSURE: 152 MMHG | DIASTOLIC BLOOD PRESSURE: 99 MMHG
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
## 2024-08-18 PROCEDURE — 99283 EMERGENCY DEPT VISIT LOW MDM: CPT

## 2024-08-18 RX ORDER — AMOXICILLIN AND CLAVULANATE POTASSIUM 875; 125 MG/1; MG/1
1 TABLET, FILM COATED ORAL 2 TIMES DAILY
Qty: 14 TABLET | Refills: 0 | Status: SHIPPED | OUTPATIENT
Start: 2024-08-18 | End: 2024-08-25

## 2024-08-18 ASSESSMENT — LIFESTYLE VARIABLES
HOW OFTEN DO YOU HAVE A DRINK CONTAINING ALCOHOL: NEVER
HOW MANY STANDARD DRINKS CONTAINING ALCOHOL DO YOU HAVE ON A TYPICAL DAY: PATIENT DOES NOT DRINK

## 2024-08-18 NOTE — DISCHARGE INSTRUCTIONS
Follow up with PCP for HIV and hepatitis testing next week  I will call you this afternoon after I speak with PSP
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
## 2024-08-19 ENCOUNTER — TELEPHONE (OUTPATIENT)
Dept: PRIMARY CARE CLINIC | Age: 34
End: 2024-08-19
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
## 2024-08-23 ENCOUNTER — OFFICE VISIT (OUTPATIENT)
Dept: PRIMARY CARE CLINIC | Age: 34
End: 2024-08-23

## 2024-08-23 VITALS
WEIGHT: 315 LBS | DIASTOLIC BLOOD PRESSURE: 80 MMHG | BODY MASS INDEX: 40.43 KG/M2 | SYSTOLIC BLOOD PRESSURE: 110 MMHG | HEART RATE: 73 BPM | HEIGHT: 74 IN | OXYGEN SATURATION: 95 % | TEMPERATURE: 97.3 F
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
## 2024-08-23 PROCEDURE — 3079F DIAST BP 80-89 MM HG: CPT

## 2024-08-23 PROCEDURE — 99213 OFFICE O/P EST LOW 20 MIN: CPT

## 2024-08-23 PROCEDURE — 3074F SYST BP LT 130 MM HG: CPT

## 2024-08-23 RX ORDER — DOXYCYCLINE HYCLATE 100 MG
100 TABLET ORAL 2 TIMES DAILY
Qty: 14 TABLET | Refills: 0 | Status: SHIPPED | OUTPATIENT
Start: 2024-08-23 | End: 2024-08-30
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
## 2024-08-29 ENCOUNTER — TELEPHONE (OUTPATIENT)
Dept: PRIMARY CARE CLINIC | Age: 34
End: 2024-08-29

## 2024-08-29 NOTE — TELEPHONE ENCOUNTER
Tried calling patient back.    Left VM letting patient know that I was happy to hear that his hand is looking better after the cat bite.     Let him know that I was available & to contact the office for anything further.

## 2024-08-29 NOTE — TELEPHONE ENCOUNTER
Patient calling to talk to Wesley to follow up on hand after cat bite    Patient states that it is looking much better    Please, give him a call [phone]

## 2024-09-15 ENCOUNTER — APPOINTMENT (OUTPATIENT)
Dept: CT IMAGING | Age: 34
End: 2024-09-15

## 2024-09-15 ENCOUNTER — HOSPITAL ENCOUNTER (EMERGENCY)
Age: 34
Discharge: HOME | End: 2024-09-15

## 2024-09-15 VITALS
OXYGEN SATURATION: 94 % | HEART RATE: 91 BPM | SYSTOLIC BLOOD PRESSURE: 155 MMHG | TEMPERATURE: 98 F | DIASTOLIC BLOOD PRESSURE: 109 MMHG | RESPIRATION RATE: 18 BRPM

## 2024-09-15 DIAGNOSIS — S39.012A STRAIN OF LUMBAR REGION, INITIAL ENCOUNTER: Primary | ICD-10-CM

## 2024-09-15 PROCEDURE — 99284 EMERGENCY DEPT VISIT MOD MDM: CPT

## 2024-09-15 PROCEDURE — 6370000000 HC RX 637 (ALT 250 FOR IP): Performed by: NURSE PRACTITIONER

## 2024-09-15 PROCEDURE — 72131 CT LUMBAR SPINE W/O DYE: CPT

## 2024-09-15 PROCEDURE — 96372 THER/PROPH/DIAG INJ SC/IM: CPT

## 2024-09-15 PROCEDURE — 6360000002 HC RX W HCPCS: Performed by: NURSE PRACTITIONER

## 2024-09-15 RX ORDER — KETOROLAC TROMETHAMINE 30 MG/ML
30 INJECTION, SOLUTION INTRAMUSCULAR; INTRAVENOUS ONCE
Status: COMPLETED | OUTPATIENT
Start: 2024-09-15 | End: 2024-09-15

## 2024-09-15 RX ORDER — DEXAMETHASONE SODIUM PHOSPHATE 10 MG/ML
10 INJECTION INTRAMUSCULAR; INTRAVENOUS ONCE
Status: COMPLETED | OUTPATIENT
Start: 2024-09-15 | End: 2024-09-15

## 2024-09-15 RX ORDER — CYCLOBENZAPRINE HCL 5 MG
5 TABLET ORAL 2 TIMES DAILY PRN
Qty: 10 TABLET | Refills: 0 | Status: SHIPPED | OUTPATIENT
Start: 2024-09-15 | End: 2024-09-20

## 2024-09-15 RX ORDER — PREDNISONE 20 MG/1
40 TABLET ORAL DAILY
Qty: 10 TABLET | Refills: 0 | Status: SHIPPED | OUTPATIENT
Start: 2024-09-15 | End: 2024-09-20

## 2024-09-15 RX ORDER — LIDOCAINE 50 MG/G
1 PATCH TOPICAL DAILY
Qty: 10 PATCH | Refills: 0 | Status: SHIPPED | OUTPATIENT
Start: 2024-09-15 | End: 2024-09-25

## 2024-09-15 RX ORDER — CYCLOBENZAPRINE HCL 10 MG
10 TABLET ORAL ONCE
Status: COMPLETED | OUTPATIENT
Start: 2024-09-15 | End: 2024-09-15

## 2024-09-15 RX ADMIN — DEXAMETHASONE SODIUM PHOSPHATE 10 MG: 10 INJECTION INTRAMUSCULAR; INTRAVENOUS at 15:25

## 2024-09-15 RX ADMIN — KETOROLAC TROMETHAMINE 30 MG: 30 INJECTION, SOLUTION INTRAMUSCULAR at 15:25

## 2024-09-15 RX ADMIN — CYCLOBENZAPRINE 10 MG: 10 TABLET, FILM COATED ORAL at 15:25

## 2025-01-16 DIAGNOSIS — I50.40 SYSTOLIC AND DIASTOLIC CHF W/REDUCED LV FUNCTION, NYHA CLASS 4 (HCC): ICD-10-CM

## 2025-01-16 NOTE — TELEPHONE ENCOUNTER
Name of Medication(s) Requested:  Requested Prescriptions     Pending Prescriptions Disp Refills    furosemide (LASIX) 20 MG tablet 90 tablet 3     Sig: TAKE ONE TABLET BY MOUTH EVERY DAY    furosemide (LASIX) 40 MG tablet 90 tablet 3     Sig: Take 1 tablet by mouth daily    lisinopril (PRINIVIL;ZESTRIL) 20 MG tablet 90 tablet 3     Sig: Take 1 tablet by mouth daily    metoprolol succinate (TOPROL XL) 25 MG extended release tablet 90 tablet 3     Sig: Take 1 tablet by mouth daily       Medication is on current medication list Yes    Dosage and directions were verified? Yes    Quantity verified: 90 day supply     Pharmacy Verified?  Yes    Last Appointment:  8/23/2024    Future appts:  Future Appointments   Date Time Provider Department Center   1/31/2025  3:30 PM Flip Minaya PA-C [facility] DEP        (If no appt send self scheduling link. .REFILLAPPT)  Scheduling request sent?     [] Yes  [x] No    Does patient need updated?  [] Yes  [x] No

## 2025-01-17 RX ORDER — LISINOPRIL 20 MG/1
20 TABLET ORAL DAILY
Qty: 90 TABLET | Refills: 0 | Status: SHIPPED | OUTPATIENT
Start: 2025-01-17 | End: 2026-01-12

## 2025-01-17 RX ORDER — METOPROLOL SUCCINATE 25 MG/1
25 TABLET, EXTENDED RELEASE ORAL DAILY
Qty: 90 TABLET | Refills: 0 | Status: SHIPPED | OUTPATIENT
Start: 2025-01-17

## 2025-01-17 RX ORDER — FUROSEMIDE 20 MG/1
TABLET ORAL
Qty: 90 TABLET | Refills: 0 | Status: SHIPPED | OUTPATIENT
Start: 2025-01-17

## 2025-01-17 RX ORDER — FUROSEMIDE 40 MG/1
40 TABLET ORAL DAILY
Qty: 90 TABLET | Refills: 0 | Status: SHIPPED | OUTPATIENT
Start: 2025-01-17

## 2025-01-17 NOTE — TELEPHONE ENCOUNTER
90 day supply w no refills. He hasn't completed labs     PC to pt, notifying Rx sent to pharmacy.      Pt voiced understanding and stated he will try to have labs done prior to appt on 1/31/25

## 2025-01-30 SDOH — ECONOMIC STABILITY: FOOD INSECURITY: WITHIN THE PAST 12 MONTHS, THE FOOD YOU BOUGHT JUST DIDN'T LAST AND YOU DIDN'T HAVE MONEY TO GET MORE.: NEVER TRUE

## 2025-01-30 SDOH — ECONOMIC STABILITY: TRANSPORTATION INSECURITY
IN THE PAST 12 MONTHS, HAS THE LACK OF TRANSPORTATION KEPT YOU FROM MEDICAL APPOINTMENTS OR FROM GETTING MEDICATIONS?: NO

## 2025-01-30 SDOH — ECONOMIC STABILITY: INCOME INSECURITY: IN THE LAST 12 MONTHS, WAS THERE A TIME WHEN YOU WERE NOT ABLE TO PAY THE MORTGAGE OR RENT ON TIME?: NO

## 2025-01-30 SDOH — ECONOMIC STABILITY: FOOD INSECURITY: WITHIN THE PAST 12 MONTHS, YOU WORRIED THAT YOUR FOOD WOULD RUN OUT BEFORE YOU GOT MONEY TO BUY MORE.: NEVER TRUE

## 2025-01-30 SDOH — ECONOMIC STABILITY: TRANSPORTATION INSECURITY
IN THE PAST 12 MONTHS, HAS LACK OF TRANSPORTATION KEPT YOU FROM MEETINGS, WORK, OR FROM GETTING THINGS NEEDED FOR DAILY LIVING?: NO

## 2025-01-30 ASSESSMENT — PATIENT HEALTH QUESTIONNAIRE - PHQ9
SUM OF ALL RESPONSES TO PHQ QUESTIONS 1-9: 0
SUM OF ALL RESPONSES TO PHQ QUESTIONS 1-9: 0
SUM OF ALL RESPONSES TO PHQ9 QUESTIONS 1 & 2: 0
2. FEELING DOWN, DEPRESSED OR HOPELESS: NOT AT ALL
1. LITTLE INTEREST OR PLEASURE IN DOING THINGS: NOT AT ALL
SUM OF ALL RESPONSES TO PHQ QUESTIONS 1-9: 0
SUM OF ALL RESPONSES TO PHQ QUESTIONS 1-9: 0

## 2025-01-31 ASSESSMENT — PATIENT HEALTH QUESTIONNAIRE - PHQ9
SUM OF ALL RESPONSES TO PHQ9 QUESTIONS 1 & 2: 0
2. FEELING DOWN, DEPRESSED OR HOPELESS: NOT AT ALL
1. LITTLE INTEREST OR PLEASURE IN DOING THINGS: NOT AT ALL

## 2025-02-11 ENCOUNTER — HOSPITAL ENCOUNTER (OUTPATIENT)
Age: 35
Discharge: HOME | End: 2025-02-11

## 2025-02-11 PROCEDURE — 36415 COLL VENOUS BLD VENIPUNCTURE: CPT

## 2025-02-11 PROCEDURE — 87389 HIV-1 AG W/HIV-1&-2 AB AG IA: CPT

## 2025-02-11 PROCEDURE — 80074 ACUTE HEPATITIS PANEL: CPT

## 2025-02-12 DIAGNOSIS — I50.22 CHRONIC SYSTOLIC CONGESTIVE HEART FAILURE (HCC): ICD-10-CM

## 2025-02-12 DIAGNOSIS — I10 ESSENTIAL HYPERTENSION: Primary | ICD-10-CM

## 2025-02-12 DIAGNOSIS — I10 ESSENTIAL HYPERTENSION: ICD-10-CM

## 2025-02-12 LAB
CHOLESTEROL, TOTAL: 101 MG/DL
HAV IGM SERPL QL IA: NONREACTIVE
HBV CORE IGM SERPL QL IA: NONREACTIVE
HBV SURFACE AG SERPL QL IA: NONREACTIVE
HCV AB SERPL QL IA: NONREACTIVE
HDLC SERPL-MCNC: 43 MG/DL
HIV 1+2 AB+HIV1 P24 AG SERPL QL IA: NONREACTIVE
LDL CHOLESTEROL: 46 MG/DL
TRIGL SERPL-MCNC: 60 MG/DL
TSH SERPL DL<=0.05 MIU/L-ACNC: 1.26 UIU/ML (ref 0.27–4.2)
VLDLC SERPL CALC-MCNC: 12 MG/DL

## 2025-02-14 ENCOUNTER — OFFICE VISIT (OUTPATIENT)
Dept: PRIMARY CARE CLINIC | Age: 35
End: 2025-02-14

## 2025-02-14 VITALS
OXYGEN SATURATION: 95 % | BODY MASS INDEX: 40.43 KG/M2 | HEART RATE: 86 BPM | HEIGHT: 74 IN | WEIGHT: 315 LBS | DIASTOLIC BLOOD PRESSURE: 82 MMHG | TEMPERATURE: 97.6 F | SYSTOLIC BLOOD PRESSURE: 118 MMHG

## 2025-02-14 DIAGNOSIS — R73.03 PREDIABETES: ICD-10-CM

## 2025-02-14 DIAGNOSIS — I50.22 CHRONIC SYSTOLIC CONGESTIVE HEART FAILURE (HCC): Primary | ICD-10-CM

## 2025-02-14 DIAGNOSIS — I10 ESSENTIAL HYPERTENSION: ICD-10-CM

## 2025-02-14 DIAGNOSIS — Z59.71 DOES NOT HAVE HEALTH INSURANCE: ICD-10-CM

## 2025-02-14 LAB
ALBUMIN: 4.4 G/DL (ref 3.5–5.2)
ALP BLD-CCNC: 54 U/L (ref 40–129)
ALT SERPL-CCNC: 37 U/L (ref 0–40)
ANION GAP SERPL CALCULATED.3IONS-SCNC: 9 MMOL/L (ref 7–16)
AST SERPL-CCNC: 24 U/L (ref 0–39)
BASOPHILS ABSOLUTE: 0.09 K/UL (ref 0–0.2)
BASOPHILS RELATIVE PERCENT: 1 % (ref 0–2)
BILIRUB SERPL-MCNC: 0.3 MG/DL (ref 0–1.2)
BUN BLDV-MCNC: 12 MG/DL (ref 6–20)
CALCIUM SERPL-MCNC: 9.4 MG/DL (ref 8.6–10.2)
CHLORIDE BLD-SCNC: 103 MMOL/L (ref 98–107)
CO2: 27 MMOL/L (ref 22–29)
CREAT SERPL-MCNC: 0.7 MG/DL (ref 0.7–1.2)
EOSINOPHILS ABSOLUTE: 0.11 K/UL (ref 0.05–0.5)
EOSINOPHILS RELATIVE PERCENT: 1 % (ref 0–6)
GFR, ESTIMATED: >90 ML/MIN/1.73M2
GLUCOSE BLD-MCNC: 114 MG/DL (ref 74–99)
HBA1C MFR BLD: 6 %
HCT VFR BLD CALC: 45.2 % (ref 37–54)
HEMOGLOBIN: 14.5 G/DL (ref 12.5–16.5)
IMMATURE GRANULOCYTES %: 0 % (ref 0–5)
IMMATURE GRANULOCYTES ABSOLUTE: 0.03 K/UL (ref 0–0.58)
LYMPHOCYTES ABSOLUTE: 2.37 K/UL (ref 1.5–4)
LYMPHOCYTES RELATIVE PERCENT: 21 % (ref 20–42)
MCH RBC QN AUTO: 29.4 PG (ref 26–35)
MCHC RBC AUTO-ENTMCNC: 32.1 G/DL (ref 32–34.5)
MCV RBC AUTO: 91.5 FL (ref 80–99.9)
MONOCYTES ABSOLUTE: 0.86 K/UL (ref 0.1–0.95)
MONOCYTES RELATIVE PERCENT: 7 % (ref 2–12)
NEUTROPHILS ABSOLUTE: 8.12 K/UL (ref 1.8–7.3)
NEUTROPHILS RELATIVE PERCENT: 70 % (ref 43–80)
PDW BLD-RTO: 14.5 % (ref 11.5–15)
PLATELET # BLD: 354 K/UL (ref 130–450)
PMV BLD AUTO: 10.7 FL (ref 7–12)
POTASSIUM SERPL-SCNC: 4.2 MMOL/L (ref 3.5–5)
RBC # BLD: 4.94 M/UL (ref 3.8–5.8)
SODIUM BLD-SCNC: 139 MMOL/L (ref 132–146)
TOTAL PROTEIN: 7.3 G/DL (ref 6.4–8.3)
WBC # BLD: 11.6 K/UL (ref 4.5–11.5)

## 2025-02-14 SDOH — ECONOMIC STABILITY: FOOD INSECURITY: WITHIN THE PAST 12 MONTHS, THE FOOD YOU BOUGHT JUST DIDN'T LAST AND YOU DIDN'T HAVE MONEY TO GET MORE.: NEVER TRUE

## 2025-02-14 SDOH — ECONOMIC STABILITY: FOOD INSECURITY: WITHIN THE PAST 12 MONTHS, YOU WORRIED THAT YOUR FOOD WOULD RUN OUT BEFORE YOU GOT MONEY TO BUY MORE.: NEVER TRUE

## 2025-02-14 ASSESSMENT — ENCOUNTER SYMPTOMS
CONSTIPATION: 0
ABDOMINAL PAIN: 0
BACK PAIN: 0
COUGH: 0
WHEEZING: 0
RHINORRHEA: 0
VOMITING: 0
SHORTNESS OF BREATH: 0
SORE THROAT: 0
PHOTOPHOBIA: 0
DIARRHEA: 0

## 2025-02-14 NOTE — PROGRESS NOTES
Subjective:  34 y.o. male who presents to the office today with chief complaint:  Chief Complaint   Patient presents with    Follow-up     Labs Drawn  Poct A1C - 6.0     Patient here for routine 6-month follow-up    Doing well today    Was still not able to obtain any health insurance.    Due for remaining lab work    Hypertension/CHF: Currently on lisinopril 20 mg daily, Lasix 60 mg daily, and metoprolol 25 mg daily.  No complaints of any recent pulmonary/cardiac symptoms.  Denies SOB and CP     Prediabetic: A1c 6.0.  Previously was 6.1    Past Medical History:   Diagnosis Date    Cellulitis     Leg    Class 3 severe obesity due to excess calories without serious comorbidity with body mass index (BMI) of 45.0 to 49.9 in adult     COPD (chronic obstructive pulmonary disease) (HCC)     THOM (obstructive sleep apnea)     Systolic and diastolic CHF w/reduced LV function, NYHA class 4 (HCC)      Past Surgical History:   Procedure Laterality Date    TONSILLECTOMY       Current Outpatient Medications   Medication Sig Dispense Refill    furosemide (LASIX) 20 MG tablet TAKE ONE TABLET BY MOUTH EVERY DAY 90 tablet 0    furosemide (LASIX) 40 MG tablet Take 1 tablet by mouth daily 90 tablet 0    lisinopril (PRINIVIL;ZESTRIL) 20 MG tablet Take 1 tablet by mouth daily 90 tablet 0    metoprolol succinate (TOPROL XL) 25 MG extended release tablet Take 1 tablet by mouth daily 90 tablet 0     No current facility-administered medications for this visit.     Health Maintenance Due   Topic Date Due    Varicella vaccine (1 of 2 - 13+ 2-dose series) Never done    Hepatitis B vaccine (1 of 3 - 19+ 3-dose series) Never done    Pneumococcal 0-49 years Vaccine (1 of 2 - PCV) Never done    Flu vaccine (1) Never done    COVID-19 Vaccine (1 - 2024-25 season) Never done     Past Medical History: CHF, hypertension, COPD, obesity  Cancer History: None  Family Cancer History: None.    Review of Systems   Constitutional:  Negative for appetite

## 2025-02-19 ENCOUNTER — TELEPHONE (OUTPATIENT)
Dept: FAMILY MEDICINE CLINIC | Age: 35
End: 2025-02-19

## 2025-02-19 NOTE — TELEPHONE ENCOUNTER
LSW initiated first phone call to patient on 2.19.25 at 10:10am regarding referral to Social Work Department. LSW was unable to make contact with pt but was able to leave a voicemail for pt with reason for call and contact information. LSW will continue to attempt contact with pt.

## 2025-02-28 ENCOUNTER — TELEPHONE (OUTPATIENT)
Dept: FAMILY MEDICINE CLINIC | Age: 35
End: 2025-02-28

## 2025-03-01 NOTE — TELEPHONE ENCOUNTER
LSW initiated second and final phone call to patient on 2.28.25 at 4:10pm regarding referral to Social Work Department. LSW was unable to make contact with pt but was able to leave a voicemail for pt with reason for call and contact information.   If pt reaches out to office, please have pt contact LSW at [phone]. Thank you

## 2025-05-10 DIAGNOSIS — I50.40 SYSTOLIC AND DIASTOLIC CHF W/REDUCED LV FUNCTION, NYHA CLASS 4 (HCC): ICD-10-CM

## 2025-05-12 RX ORDER — FUROSEMIDE 40 MG/1
40 TABLET ORAL DAILY
Qty: 90 TABLET | Refills: 1 | Status: SHIPPED | OUTPATIENT
Start: 2025-05-12

## 2025-05-12 RX ORDER — FUROSEMIDE 20 MG/1
TABLET ORAL
Qty: 90 TABLET | Refills: 1 | Status: SHIPPED | OUTPATIENT
Start: 2025-05-12

## 2025-05-12 RX ORDER — METOPROLOL SUCCINATE 25 MG/1
25 TABLET, EXTENDED RELEASE ORAL DAILY
Qty: 90 TABLET | Refills: 1 | Status: SHIPPED | OUTPATIENT
Start: 2025-05-12

## 2025-05-12 RX ORDER — LISINOPRIL 20 MG/1
20 TABLET ORAL DAILY
Qty: 90 TABLET | Refills: 1 | Status: SHIPPED | OUTPATIENT
Start: 2025-05-12 | End: 2025-11-08

## 2025-05-12 NOTE — TELEPHONE ENCOUNTER
Name of Medication(s) Requested:  Requested Prescriptions     Pending Prescriptions Disp Refills    furosemide (LASIX) 20 MG tablet 90 tablet 0     Sig: TAKE ONE TABLET BY MOUTH EVERY DAY    furosemide (LASIX) 40 MG tablet 90 tablet 0     Sig: Take 1 tablet by mouth daily    lisinopril (PRINIVIL;ZESTRIL) 20 MG tablet 90 tablet 0     Sig: Take 1 tablet by mouth daily    metoprolol succinate (TOPROL XL) 25 MG extended release tablet 90 tablet 0     Sig: Take 1 tablet by mouth daily       Medication is on current medication list Yes    Dosage and directions were verified? Yes    Quantity verified: 90 day supply     Pharmacy Verified?  Yes    Last Appointment:  2/14/2025    Future appts:  Future Appointments   Date Time Provider Department Center   8/15/2025  3:30 PM Flip Minaya PA-C [facility] DE[facility]        (If no appt send self scheduling link. .REFILLAPPT)  Scheduling request sent?     [] Yes  [x] No    Does patient need updated?  [] Yes  [x] No

## 2025-07-21 ENCOUNTER — HOSPITAL ENCOUNTER (INPATIENT)
Age: 35
LOS: 2 days | Discharge: HOME | DRG: 871 | End: 2025-07-24
Admitting: INTERNAL MEDICINE

## 2025-07-21 ENCOUNTER — OFFICE VISIT (OUTPATIENT)
Dept: PRIMARY CARE CLINIC | Age: 35
End: 2025-07-21

## 2025-07-21 ENCOUNTER — APPOINTMENT (OUTPATIENT)
Dept: GENERAL RADIOLOGY | Age: 35
DRG: 871 | End: 2025-07-21

## 2025-07-21 VITALS
HEIGHT: 74 IN | TEMPERATURE: 97.5 F | OXYGEN SATURATION: 92 % | SYSTOLIC BLOOD PRESSURE: 118 MMHG | BODY MASS INDEX: 40.43 KG/M2 | HEART RATE: 88 BPM | WEIGHT: 315 LBS | DIASTOLIC BLOOD PRESSURE: 88 MMHG

## 2025-07-21 DIAGNOSIS — J96.01 ACUTE RESPIRATORY FAILURE WITH HYPOXIA (HCC): Primary | ICD-10-CM

## 2025-07-21 DIAGNOSIS — R07.9 CHEST PAIN, UNSPECIFIED TYPE: Primary | ICD-10-CM

## 2025-07-21 DIAGNOSIS — R06.02 SOB (SHORTNESS OF BREATH): ICD-10-CM

## 2025-07-21 DIAGNOSIS — R00.2 PALPITATIONS: ICD-10-CM

## 2025-07-21 DIAGNOSIS — J18.9 PNEUMONIA OF RIGHT MIDDLE LOBE DUE TO INFECTIOUS ORGANISM: ICD-10-CM

## 2025-07-21 LAB
ALBUMIN SERPL-MCNC: 3.7 G/DL (ref 3.5–5.2)
ALP SERPL-CCNC: 48 U/L (ref 40–129)
ALT SERPL-CCNC: 36 U/L (ref 0–50)
ANION GAP SERPL CALCULATED.3IONS-SCNC: 11 MMOL/L (ref 7–16)
AST SERPL-CCNC: 45 U/L (ref 0–50)
BASOPHILS # BLD: 0.09 K/UL (ref 0–0.2)
BASOPHILS NFR BLD: 1 % (ref 0–2)
BILIRUB SERPL-MCNC: 0.3 MG/DL (ref 0–1.2)
BNP SERPL-MCNC: 41 PG/ML (ref 0–125)
BUN SERPL-MCNC: 11 MG/DL (ref 6–20)
CALCIUM SERPL-MCNC: 9.1 MG/DL (ref 8.6–10)
CHLORIDE SERPL-SCNC: 104 MMOL/L (ref 98–107)
CO2 SERPL-SCNC: 23 MMOL/L (ref 22–29)
CREAT SERPL-MCNC: 0.8 MG/DL (ref 0.7–1.2)
D-DIMER QUANTITATIVE: <200 NG/ML DDU (ref 0–230)
EOSINOPHIL # BLD: 0.11 K/UL (ref 0.05–0.5)
EOSINOPHILS RELATIVE PERCENT: 1 % (ref 0–6)
ERYTHROCYTE [DISTWIDTH] IN BLOOD BY AUTOMATED COUNT: 14.5 % (ref 11.5–15)
GFR, ESTIMATED: >90 ML/MIN/1.73M2
GLUCOSE SERPL-MCNC: 114 MG/DL (ref 74–99)
HCT VFR BLD AUTO: 40.8 % (ref 37–54)
HGB BLD-MCNC: 13 G/DL (ref 12.5–16.5)
IMM GRANULOCYTES # BLD AUTO: 0.04 K/UL (ref 0–0.58)
IMM GRANULOCYTES NFR BLD: 0 % (ref 0–5)
LYMPHOCYTES NFR BLD: 2.73 K/UL (ref 1.5–4)
LYMPHOCYTES RELATIVE PERCENT: 22 % (ref 20–42)
MCH RBC QN AUTO: 30 PG (ref 26–35)
MCHC RBC AUTO-ENTMCNC: 31.9 G/DL (ref 32–34.5)
MCV RBC AUTO: 94.2 FL (ref 80–99.9)
MONOCYTES NFR BLD: 0.74 K/UL (ref 0.1–0.95)
MONOCYTES NFR BLD: 6 % (ref 2–12)
NEUTROPHILS NFR BLD: 70 % (ref 43–80)
NEUTS SEG NFR BLD: 8.55 K/UL (ref 1.8–7.3)
PLATELET # BLD AUTO: 290 K/UL (ref 130–450)
PMV BLD AUTO: 10.7 FL (ref 7–12)
POTASSIUM SERPL-SCNC: 4.3 MMOL/L (ref 3.5–5.1)
PROT SERPL-MCNC: 7.3 G/DL (ref 6.4–8.3)
RBC # BLD AUTO: 4.33 M/UL (ref 3.8–5.8)
SODIUM SERPL-SCNC: 139 MMOL/L (ref 136–145)
WBC OTHER # BLD: 12.3 K/UL (ref 4.5–11.5)

## 2025-07-21 PROCEDURE — 80053 COMPREHEN METABOLIC PANEL: CPT

## 2025-07-21 PROCEDURE — 3079F DIAST BP 80-89 MM HG: CPT

## 2025-07-21 PROCEDURE — 83880 ASSAY OF NATRIURETIC PEPTIDE: CPT

## 2025-07-21 PROCEDURE — 71046 X-RAY EXAM CHEST 2 VIEWS: CPT

## 2025-07-21 PROCEDURE — 99213 OFFICE O/P EST LOW 20 MIN: CPT

## 2025-07-21 PROCEDURE — 3074F SYST BP LT 130 MM HG: CPT

## 2025-07-21 PROCEDURE — 93000 ELECTROCARDIOGRAM COMPLETE: CPT

## 2025-07-21 PROCEDURE — 85025 COMPLETE CBC W/AUTO DIFF WBC: CPT

## 2025-07-21 PROCEDURE — 85379 FIBRIN DEGRADATION QUANT: CPT

## 2025-07-21 PROCEDURE — 93005 ELECTROCARDIOGRAM TRACING: CPT

## 2025-07-21 PROCEDURE — 99285 EMERGENCY DEPT VISIT HI MDM: CPT

## 2025-07-21 RX ORDER — DOXYCYCLINE 100 MG/1
100 CAPSULE ORAL ONCE
Status: COMPLETED | OUTPATIENT
Start: 2025-07-22 | End: 2025-07-22

## 2025-07-21 NOTE — PROGRESS NOTES
Subjective:  35 y.o. male who presents to the office today with chief complaint:  Chief Complaint   Patient presents with    Follow-up     patient states over the weekend when ever he was moving around, his oxygen was dropping (80) and then his heart rate would go up (147), states when the oxygen would drop he would get light-headed and his eyelids/brows would get tender  Doubled up his metoprolol succinate today      Here with complaints of palpitations, tachycardia and shortness of breath.  States over the weekend his smart watch notified him with heart rates in the 140s or higher.  States oxygen saturation dropped to 80% on RA.  He was feeling lightheaded.  Today he states he took a double dose of metoprolol.  Denies any current shortness of breath, chest pain or palpitations    Health Maintenance Due   Topic Date Due    Varicella vaccine (1 of 2 - 13+ 2-dose series) Never done    Hepatitis B vaccine (1 of 3 - 19+ 3-dose series) Never done    Pneumococcal 0-49 years Vaccine (1 of 2 - PCV) Never done    COVID-19 Vaccine (1 - 2024-25 season) Never done       Past Medical History:   Diagnosis Date    Cellulitis     Leg    Class 3 severe obesity due to excess calories without serious comorbidity with body mass index (BMI) of 45.0 to 49.9 in adult ([facility])     COPD (chronic obstructive pulmonary disease) ([facility])     THOM (obstructive sleep apnea)     Systolic and diastolic CHF w/reduced LV function, NYHA class 4 ([facility])        Past Surgical History:   Procedure Laterality Date    TONSILLECTOMY          No current facility-administered medications for this visit.     Current Outpatient Medications   Medication Sig Dispense Refill    cefdinir (OMNICEF) 300 MG capsule Take 1 capsule by mouth 2 times daily for 10 days 20 capsule 0    doxycycline hyclate (VIBRAMYCIN) 100 MG capsule Take 1 capsule by mouth 2 times daily for 10 days 20 capsule 0     Facility-Administered Medications Ordered in Other Visits   Medication Dose Route

## 2025-07-22 PROBLEM — J18.9 COMMUNITY ACQUIRED PNEUMONIA: Status: ACTIVE | Noted: 2025-07-22

## 2025-07-22 PROBLEM — J15.9 COMMUNITY ACQUIRED BACTERIAL PNEUMONIA: Status: ACTIVE | Noted: 2025-07-22

## 2025-07-22 LAB
ALBUMIN SERPL-MCNC: 4 G/DL (ref 3.5–5.2)
ALP SERPL-CCNC: 51 U/L (ref 40–129)
ALT SERPL-CCNC: 35 U/L (ref 0–50)
ANION GAP SERPL CALCULATED.3IONS-SCNC: 13 MMOL/L (ref 7–16)
AST SERPL-CCNC: 31 U/L (ref 0–50)
B PARAP IS1001 DNA NPH QL NAA+NON-PROBE: NOT DETECTED
B PERT DNA SPEC QL NAA+PROBE: NOT DETECTED
BASOPHILS # BLD: 0.07 K/UL (ref 0–0.2)
BASOPHILS NFR BLD: 1 % (ref 0–2)
BILIRUB DIRECT SERPL-MCNC: <0.1 MG/DL (ref 0–0.2)
BILIRUB INDIRECT SERPL-MCNC: NORMAL MG/DL (ref 0–1)
BILIRUB SERPL-MCNC: 0.2 MG/DL (ref 0–1.2)
BUN SERPL-MCNC: 9 MG/DL (ref 6–20)
C PNEUM DNA NPH QL NAA+NON-PROBE: NOT DETECTED
CALCIUM SERPL-MCNC: 9.8 MG/DL (ref 8.6–10)
CHLORIDE SERPL-SCNC: 103 MMOL/L (ref 98–107)
CO2 SERPL-SCNC: 24 MMOL/L (ref 22–29)
CREAT SERPL-MCNC: 0.7 MG/DL (ref 0.7–1.2)
EOSINOPHIL # BLD: 0.09 K/UL (ref 0.05–0.5)
EOSINOPHILS RELATIVE PERCENT: 1 % (ref 0–6)
ERYTHROCYTE [DISTWIDTH] IN BLOOD BY AUTOMATED COUNT: 14.5 % (ref 11.5–15)
FLUAV RNA NPH QL NAA+NON-PROBE: NOT DETECTED
FLUBV RNA NPH QL NAA+NON-PROBE: NOT DETECTED
GFR, ESTIMATED: >90 ML/MIN/1.73M2
GLUCOSE SERPL-MCNC: 135 MG/DL (ref 74–99)
HADV DNA NPH QL NAA+NON-PROBE: NOT DETECTED
HCOV 229E RNA NPH QL NAA+NON-PROBE: NOT DETECTED
HCOV HKU1 RNA NPH QL NAA+NON-PROBE: NOT DETECTED
HCOV NL63 RNA NPH QL NAA+NON-PROBE: NOT DETECTED
HCOV OC43 RNA NPH QL NAA+NON-PROBE: NOT DETECTED
HCT VFR BLD AUTO: 40.9 % (ref 37–54)
HGB BLD-MCNC: 13.5 G/DL (ref 12.5–16.5)
HMPV RNA NPH QL NAA+NON-PROBE: NOT DETECTED
HPIV1 RNA NPH QL NAA+NON-PROBE: NOT DETECTED
HPIV2 RNA NPH QL NAA+NON-PROBE: NOT DETECTED
HPIV3 RNA NPH QL NAA+NON-PROBE: NOT DETECTED
HPIV4 RNA NPH QL NAA+NON-PROBE: NOT DETECTED
IMM GRANULOCYTES # BLD AUTO: 0.07 K/UL (ref 0–0.58)
IMM GRANULOCYTES NFR BLD: 1 % (ref 0–5)
L PNEUMO1 AG UR QL IA.RAPID: NEGATIVE
LYMPHOCYTES NFR BLD: 2.9 K/UL (ref 1.5–4)
LYMPHOCYTES RELATIVE PERCENT: 30 % (ref 20–42)
M PNEUMO DNA NPH QL NAA+NON-PROBE: NOT DETECTED
MAGNESIUM SERPL-MCNC: 1.9 MG/DL (ref 1.6–2.6)
MCH RBC QN AUTO: 30.4 PG (ref 26–35)
MCHC RBC AUTO-ENTMCNC: 33 G/DL (ref 32–34.5)
MCV RBC AUTO: 92.1 FL (ref 80–99.9)
MONOCYTES NFR BLD: 0.59 K/UL (ref 0.1–0.95)
MONOCYTES NFR BLD: 6 % (ref 2–12)
NEUTROPHILS NFR BLD: 61 % (ref 43–80)
NEUTS SEG NFR BLD: 5.85 K/UL (ref 1.8–7.3)
PLATELET # BLD AUTO: 276 K/UL (ref 130–450)
PMV BLD AUTO: 10.2 FL (ref 7–12)
POTASSIUM SERPL-SCNC: 3.4 MMOL/L (ref 3.5–5.1)
PROCALCITONIN SERPL-MCNC: 0.05 NG/ML (ref 0–0.08)
PROT SERPL-MCNC: 7.4 G/DL (ref 6.4–8.3)
RBC # BLD AUTO: 4.44 M/UL (ref 3.8–5.8)
RSV RNA NPH QL NAA+NON-PROBE: NOT DETECTED
RV+EV RNA NPH QL NAA+NON-PROBE: NOT DETECTED
S PNEUM AG SPEC QL: NEGATIVE
SARS-COV-2 RNA NPH QL NAA+NON-PROBE: NOT DETECTED
SODIUM SERPL-SCNC: 139 MMOL/L (ref 136–145)
SPECIMEN DESCRIPTION: NORMAL
SPECIMEN SOURCE: NORMAL
TROPONIN I SERPL HS-MCNC: <6 NG/L (ref 0–22)
WBC OTHER # BLD: 9.6 K/UL (ref 4.5–11.5)

## 2025-07-22 PROCEDURE — 96374 THER/PROPH/DIAG INJ IV PUSH: CPT

## 2025-07-22 PROCEDURE — 6370000000 HC RX 637 (ALT 250 FOR IP): Performed by: NURSE PRACTITIONER

## 2025-07-22 PROCEDURE — 83735 ASSAY OF MAGNESIUM: CPT

## 2025-07-22 PROCEDURE — 94669 MECHANICAL CHEST WALL OSCILL: CPT

## 2025-07-22 PROCEDURE — 87899 AGENT NOS ASSAY W/OPTIC: CPT

## 2025-07-22 PROCEDURE — 2500000003 HC RX 250 WO HCPCS

## 2025-07-22 PROCEDURE — 87449 NOS EACH ORGANISM AG IA: CPT

## 2025-07-22 PROCEDURE — 2580000003 HC RX 258: Performed by: NURSE PRACTITIONER

## 2025-07-22 PROCEDURE — 6370000000 HC RX 637 (ALT 250 FOR IP)

## 2025-07-22 PROCEDURE — 1200000000 HC SEMI PRIVATE

## 2025-07-22 PROCEDURE — 6360000002 HC RX W HCPCS: Performed by: NURSE PRACTITIONER

## 2025-07-22 PROCEDURE — 94664 DEMO&/EVAL PT USE INHALER: CPT

## 2025-07-22 PROCEDURE — 84145 PROCALCITONIN (PCT): CPT

## 2025-07-22 PROCEDURE — 94640 AIRWAY INHALATION TREATMENT: CPT

## 2025-07-22 PROCEDURE — 2500000003 HC RX 250 WO HCPCS: Performed by: NURSE PRACTITIONER

## 2025-07-22 PROCEDURE — 0202U NFCT DS 22 TRGT SARS-COV-2: CPT

## 2025-07-22 PROCEDURE — 86738 MYCOPLASMA ANTIBODY: CPT

## 2025-07-22 PROCEDURE — 82248 BILIRUBIN DIRECT: CPT

## 2025-07-22 PROCEDURE — 80053 COMPREHEN METABOLIC PANEL: CPT

## 2025-07-22 PROCEDURE — 87040 BLOOD CULTURE FOR BACTERIA: CPT

## 2025-07-22 PROCEDURE — 6360000002 HC RX W HCPCS

## 2025-07-22 PROCEDURE — 85025 COMPLETE CBC W/AUTO DIFF WBC: CPT

## 2025-07-22 PROCEDURE — 6370000000 HC RX 637 (ALT 250 FOR IP): Performed by: EMERGENCY MEDICINE

## 2025-07-22 PROCEDURE — 84484 ASSAY OF TROPONIN QUANT: CPT

## 2025-07-22 RX ORDER — SODIUM CHLORIDE 0.9 % (FLUSH) 0.9 %
5-40 SYRINGE (ML) INJECTION PRN
Status: DISCONTINUED | OUTPATIENT
Start: 2025-07-22 | End: 2025-07-24 | Stop reason: HOSPADM

## 2025-07-22 RX ORDER — GUAIFENESIN 600 MG/1
1200 TABLET, EXTENDED RELEASE ORAL 2 TIMES DAILY
Status: DISCONTINUED | OUTPATIENT
Start: 2025-07-22 | End: 2025-07-24 | Stop reason: HOSPADM

## 2025-07-22 RX ORDER — IPRATROPIUM BROMIDE AND ALBUTEROL SULFATE 2.5; .5 MG/3ML; MG/3ML
1 SOLUTION RESPIRATORY (INHALATION) CONTINUOUS
Status: DISCONTINUED | OUTPATIENT
Start: 2025-07-22 | End: 2025-07-22

## 2025-07-22 RX ORDER — ONDANSETRON 2 MG/ML
4 INJECTION INTRAMUSCULAR; INTRAVENOUS EVERY 6 HOURS PRN
Status: DISCONTINUED | OUTPATIENT
Start: 2025-07-22 | End: 2025-07-24 | Stop reason: HOSPADM

## 2025-07-22 RX ORDER — BUDESONIDE 0.5 MG/2ML
0.5 INHALANT ORAL
Status: DISCONTINUED | OUTPATIENT
Start: 2025-07-22 | End: 2025-07-24 | Stop reason: HOSPADM

## 2025-07-22 RX ORDER — ARFORMOTEROL TARTRATE 15 UG/2ML
15 SOLUTION RESPIRATORY (INHALATION)
Status: DISCONTINUED | OUTPATIENT
Start: 2025-07-22 | End: 2025-07-24 | Stop reason: HOSPADM

## 2025-07-22 RX ORDER — SODIUM CHLORIDE 0.9 % (FLUSH) 0.9 %
5-40 SYRINGE (ML) INJECTION EVERY 12 HOURS SCHEDULED
Status: DISCONTINUED | OUTPATIENT
Start: 2025-07-22 | End: 2025-07-24 | Stop reason: HOSPADM

## 2025-07-22 RX ORDER — ASPIRIN 81 MG/1
81 TABLET, CHEWABLE ORAL DAILY
Status: DISCONTINUED | OUTPATIENT
Start: 2025-07-22 | End: 2025-07-24 | Stop reason: HOSPADM

## 2025-07-22 RX ORDER — POLYETHYLENE GLYCOL 3350 17 G/17G
17 POWDER, FOR SOLUTION ORAL DAILY PRN
Status: DISCONTINUED | OUTPATIENT
Start: 2025-07-22 | End: 2025-07-24 | Stop reason: HOSPADM

## 2025-07-22 RX ORDER — DOXYCYCLINE 100 MG/1
100 CAPSULE ORAL 2 TIMES DAILY
Qty: 20 CAPSULE | Refills: 0 | Status: SHIPPED | OUTPATIENT
Start: 2025-07-22 | End: 2025-07-24

## 2025-07-22 RX ORDER — LISINOPRIL 20 MG/1
20 TABLET ORAL DAILY
Status: DISCONTINUED | OUTPATIENT
Start: 2025-07-22 | End: 2025-07-24 | Stop reason: HOSPADM

## 2025-07-22 RX ORDER — POTASSIUM CHLORIDE 1500 MG/1
40 TABLET, EXTENDED RELEASE ORAL PRN
Status: DISCONTINUED | OUTPATIENT
Start: 2025-07-22 | End: 2025-07-24 | Stop reason: HOSPADM

## 2025-07-22 RX ORDER — ALBUTEROL SULFATE 0.83 MG/ML
2.5 SOLUTION RESPIRATORY (INHALATION) EVERY 4 HOURS PRN
Status: DISCONTINUED | OUTPATIENT
Start: 2025-07-22 | End: 2025-07-24 | Stop reason: HOSPADM

## 2025-07-22 RX ORDER — METOPROLOL SUCCINATE 25 MG/1
25 TABLET, EXTENDED RELEASE ORAL DAILY
Status: DISCONTINUED | OUTPATIENT
Start: 2025-07-22 | End: 2025-07-24 | Stop reason: HOSPADM

## 2025-07-22 RX ORDER — ACETAMINOPHEN 325 MG/1
650 TABLET ORAL EVERY 6 HOURS PRN
Status: DISCONTINUED | OUTPATIENT
Start: 2025-07-22 | End: 2025-07-24 | Stop reason: HOSPADM

## 2025-07-22 RX ORDER — FUROSEMIDE 20 MG/1
20 TABLET ORAL DAILY
Status: DISCONTINUED | OUTPATIENT
Start: 2025-07-22 | End: 2025-07-24 | Stop reason: HOSPADM

## 2025-07-22 RX ORDER — CEFDINIR 300 MG/1
300 CAPSULE ORAL 2 TIMES DAILY
Qty: 20 CAPSULE | Refills: 0 | Status: SHIPPED | OUTPATIENT
Start: 2025-07-22 | End: 2025-07-24 | Stop reason: HOSPADM

## 2025-07-22 RX ORDER — MAGNESIUM SULFATE IN WATER 40 MG/ML
2000 INJECTION, SOLUTION INTRAVENOUS PRN
Status: DISCONTINUED | OUTPATIENT
Start: 2025-07-22 | End: 2025-07-24 | Stop reason: HOSPADM

## 2025-07-22 RX ORDER — ACETAMINOPHEN 650 MG/1
650 SUPPOSITORY RECTAL EVERY 6 HOURS PRN
Status: DISCONTINUED | OUTPATIENT
Start: 2025-07-22 | End: 2025-07-24 | Stop reason: HOSPADM

## 2025-07-22 RX ORDER — ENOXAPARIN SODIUM 100 MG/ML
60 INJECTION SUBCUTANEOUS 2 TIMES DAILY
Status: DISCONTINUED | OUTPATIENT
Start: 2025-07-22 | End: 2025-07-24 | Stop reason: HOSPADM

## 2025-07-22 RX ORDER — ONDANSETRON 4 MG/1
4 TABLET, ORALLY DISINTEGRATING ORAL EVERY 8 HOURS PRN
Status: DISCONTINUED | OUTPATIENT
Start: 2025-07-22 | End: 2025-07-24 | Stop reason: HOSPADM

## 2025-07-22 RX ORDER — HYDRALAZINE HYDROCHLORIDE 20 MG/ML
5 INJECTION INTRAMUSCULAR; INTRAVENOUS EVERY 4 HOURS PRN
Status: DISCONTINUED | OUTPATIENT
Start: 2025-07-22 | End: 2025-07-24 | Stop reason: HOSPADM

## 2025-07-22 RX ORDER — POTASSIUM CHLORIDE 7.45 MG/ML
10 INJECTION INTRAVENOUS PRN
Status: DISCONTINUED | OUTPATIENT
Start: 2025-07-22 | End: 2025-07-24 | Stop reason: HOSPADM

## 2025-07-22 RX ORDER — ENOXAPARIN SODIUM 100 MG/ML
40 INJECTION SUBCUTANEOUS DAILY
Status: DISCONTINUED | OUTPATIENT
Start: 2025-07-22 | End: 2025-07-22 | Stop reason: DRUGHIGH

## 2025-07-22 RX ORDER — SODIUM CHLORIDE 9 MG/ML
INJECTION, SOLUTION INTRAVENOUS PRN
Status: DISCONTINUED | OUTPATIENT
Start: 2025-07-22 | End: 2025-07-24 | Stop reason: HOSPADM

## 2025-07-22 RX ADMIN — DOXYCYCLINE HYCLATE 100 MG: 100 CAPSULE ORAL at 02:50

## 2025-07-22 RX ADMIN — ASPIRIN 81 MG: 81 TABLET, CHEWABLE ORAL at 10:59

## 2025-07-22 RX ADMIN — SODIUM CHLORIDE, PRESERVATIVE FREE 10 ML: 5 INJECTION INTRAVENOUS at 15:00

## 2025-07-22 RX ADMIN — GUAIFENESIN 1200 MG: 600 TABLET, EXTENDED RELEASE ORAL at 10:58

## 2025-07-22 RX ADMIN — GUAIFENESIN 1200 MG: 600 TABLET, EXTENDED RELEASE ORAL at 21:39

## 2025-07-22 RX ADMIN — ENOXAPARIN SODIUM 60 MG: 100 INJECTION SUBCUTANEOUS at 21:39

## 2025-07-22 RX ADMIN — BUDESONIDE 500 MCG: 0.5 INHALANT RESPIRATORY (INHALATION) at 05:42

## 2025-07-22 RX ADMIN — IPRATROPIUM BROMIDE AND ALBUTEROL SULFATE 1 DOSE: .5; 2.5 SOLUTION RESPIRATORY (INHALATION) at 04:45

## 2025-07-22 RX ADMIN — ALBUTEROL SULFATE 2.5 MG: 2.5 SOLUTION RESPIRATORY (INHALATION) at 07:07

## 2025-07-22 RX ADMIN — WATER 2000 MG: 1 INJECTION INTRAMUSCULAR; INTRAVENOUS; SUBCUTANEOUS at 02:51

## 2025-07-22 RX ADMIN — ARFORMOTEROL TARTRATE 15 MCG: 15 SOLUTION RESPIRATORY (INHALATION) at 07:08

## 2025-07-22 RX ADMIN — ARFORMOTEROL TARTRATE 15 MCG: 15 SOLUTION RESPIRATORY (INHALATION) at 18:13

## 2025-07-22 RX ADMIN — FUROSEMIDE 20 MG: 20 TABLET ORAL at 10:58

## 2025-07-22 RX ADMIN — SODIUM CHLORIDE, PRESERVATIVE FREE 10 ML: 5 INJECTION INTRAVENOUS at 21:41

## 2025-07-22 RX ADMIN — POTASSIUM CHLORIDE 40 MEQ: 1500 TABLET, EXTENDED RELEASE ORAL at 14:54

## 2025-07-22 RX ADMIN — METOPROLOL SUCCINATE 25 MG: 25 TABLET, EXTENDED RELEASE ORAL at 10:59

## 2025-07-22 RX ADMIN — ENOXAPARIN SODIUM 60 MG: 100 INJECTION SUBCUTANEOUS at 10:59

## 2025-07-22 RX ADMIN — DOXYCYCLINE 100 MG: 100 INJECTION, POWDER, LYOPHILIZED, FOR SOLUTION INTRAVENOUS at 15:00

## 2025-07-22 RX ADMIN — BUDESONIDE 500 MCG: 0.5 INHALANT RESPIRATORY (INHALATION) at 18:13

## 2025-07-22 RX ADMIN — BUDESONIDE 500 MCG: 0.5 INHALANT RESPIRATORY (INHALATION) at 07:08

## 2025-07-22 RX ADMIN — ALBUTEROL SULFATE 2.5 MG: 2.5 SOLUTION RESPIRATORY (INHALATION) at 18:13

## 2025-07-22 RX ADMIN — ARFORMOTEROL TARTRATE 15 MCG: 15 SOLUTION RESPIRATORY (INHALATION) at 05:42

## 2025-07-22 RX ADMIN — LISINOPRIL 20 MG: 20 TABLET ORAL at 10:58

## 2025-07-22 ASSESSMENT — LIFESTYLE VARIABLES: HOW OFTEN DO YOU HAVE A DRINK CONTAINING ALCOHOL: PATIENT DECLINED

## 2025-07-22 NOTE — H&P
Internal Medicine Progress Note     [facility]     Karan Rodriguez D.O., ANDREW Zhang D.O., MARIN.  Timur Hoffmann D.O.   __________________________________________    Silvio Billings, MSN, APRN-CNP  Santos Mansfield, MSN, APRN, NP-C  Fiona Guzman, MSN, APRN, NP-C  Elizabeth Nunez, MSN, APRN, NP-C       Department of Internal Medicine  History and Physical Examination     Primary Care Physician: Flip Minaya PA-C   Admitting Physician:  Hayder Zhang DO  Admission date and time: 7/21/2025  7:46 PM    Room:  Internal Waiting /[facility]*  Admitting diagnosis: Community acquired bacterial pneumonia [J15.9]    Patient Name: Harvinder Campos  MRN: [id number]    Date of Service: 7/22/2025     Chief Complaint: Shortness of breath    HISTORY OF PRESENT ILLNESS:    Harvinder Campos is a 35-year-old male patient who presented to [facility] for evaluation of cough, shortness of breath and upper respiratory symptoms.  He was reportedly seen by his primary care physician, during visit and auscultation exam yielded concern for atrial fibrillation as was felt to be irregular.  On arrival to the emergency department heart rate was elevated with borderline low blood pressures but otherwise stable vital signs.  He was given fluids and further workup was undertaken.  IV fluids corrected heart rate.  EKG yielded sinus arrhythmia without acute infarction/ischemia related changes per ER interpretation.  Chest x-ray 2 views showed what appeared to be a pneumonia however study was limited by the patient's very large body habitus.  CBC showed leukocytosis with 12,300 white count.  Metabolic panel unremarkable.  D-dimer negative.  proBNP not elevated.  Troponin negative.  Patient was given fluid bolus, IV antibiotics.  He was ambulated and desaturated to 86% on room air.  To note, the patient weighs nearly 500 pounds and has a BMI of 63.68 with severe sleep apnea and pulmonary

## 2025-07-22 NOTE — PROGRESS NOTES
This patient is on medication that requires renal, weight, and/or indication dose adjustment.      Date Body Weight IBW  Adjusted BW SCr  CrCl Dialysis status   7/22/2025   Ideal body weight: 82.2 kg (181 lb 3.5 oz)  Adjusted ideal body weight: 139.3 kg (307 lb 2.1 oz) Serum creatinine: 0.8 mg/dL 07/21/25 2123  Estimated creatinine clearance: 252 mL/min N/a       Pharmacy has dose-adjusted the following medication(s):    Date Previous Order Adjusted Order   7/22/2025 Enoxaparin 40mg daily Enoxaparin 60mg twice daily       These changes were made per protocol according to the [facility]   Automatic Renal Dose Adjustment Policy.     *Please note this dose may need readjusted if patient's condition changes.    Please contact pharmacy with any questions regarding these changes.    Jose Antonio Moralez RPH  7/22/2025  5:19 AM

## 2025-07-23 ENCOUNTER — APPOINTMENT (OUTPATIENT)
Dept: GENERAL RADIOLOGY | Age: 35
DRG: 871 | End: 2025-07-23

## 2025-07-23 LAB
ALBUMIN SERPL-MCNC: 3.6 G/DL (ref 3.5–5.2)
ALP SERPL-CCNC: 48 U/L (ref 40–129)
ALT SERPL-CCNC: 32 U/L (ref 0–50)
ANION GAP SERPL CALCULATED.3IONS-SCNC: 12 MMOL/L (ref 7–16)
AST SERPL-CCNC: 28 U/L (ref 0–50)
BASOPHILS # BLD: 0.07 K/UL (ref 0–0.2)
BASOPHILS NFR BLD: 1 % (ref 0–2)
BILIRUB DIRECT SERPL-MCNC: <0.1 MG/DL (ref 0–0.2)
BILIRUB INDIRECT SERPL-MCNC: NORMAL MG/DL (ref 0–1)
BILIRUB SERPL-MCNC: 0.3 MG/DL (ref 0–1.2)
BUN SERPL-MCNC: 8 MG/DL (ref 6–20)
CALCIUM SERPL-MCNC: 8.7 MG/DL (ref 8.6–10)
CHLORIDE SERPL-SCNC: 103 MMOL/L (ref 98–107)
CHOLEST SERPL-MCNC: 85 MG/DL
CO2 SERPL-SCNC: 23 MMOL/L (ref 22–29)
CREAT SERPL-MCNC: 0.7 MG/DL (ref 0.7–1.2)
EOSINOPHIL # BLD: 0.13 K/UL (ref 0.05–0.5)
EOSINOPHILS RELATIVE PERCENT: 2 % (ref 0–6)
ERYTHROCYTE [DISTWIDTH] IN BLOOD BY AUTOMATED COUNT: 14.8 % (ref 11.5–15)
GFR, ESTIMATED: >90 ML/MIN/1.73M2
GLUCOSE SERPL-MCNC: 68 MG/DL (ref 74–99)
HCT VFR BLD AUTO: 42.1 % (ref 37–54)
HDLC SERPL-MCNC: 41 MG/DL
HGB BLD-MCNC: 13.1 G/DL (ref 12.5–16.5)
IMM GRANULOCYTES # BLD AUTO: 0.04 K/UL (ref 0–0.58)
IMM GRANULOCYTES NFR BLD: 1 % (ref 0–5)
LDLC SERPL CALC-MCNC: 29 MG/DL
LYMPHOCYTES NFR BLD: 2.33 K/UL (ref 1.5–4)
LYMPHOCYTES RELATIVE PERCENT: 27 % (ref 20–42)
MAGNESIUM SERPL-MCNC: 1.9 MG/DL (ref 1.6–2.6)
MCH RBC QN AUTO: 30.5 PG (ref 26–35)
MCHC RBC AUTO-ENTMCNC: 31.1 G/DL (ref 32–34.5)
MCV RBC AUTO: 97.9 FL (ref 80–99.9)
MONOCYTES NFR BLD: 0.47 K/UL (ref 0.1–0.95)
MONOCYTES NFR BLD: 5 % (ref 2–12)
MYCOPLASMA AB,IGM: NORMAL
NEUTROPHILS NFR BLD: 65 % (ref 43–80)
NEUTS SEG NFR BLD: 5.68 K/UL (ref 1.8–7.3)
PLATELET CONFIRMATION: NORMAL
PLATELET, FLUORESCENCE: 146 K/UL (ref 130–450)
PMV BLD AUTO: 11.6 FL (ref 7–12)
POTASSIUM SERPL-SCNC: 4 MMOL/L (ref 3.5–5.1)
PROT SERPL-MCNC: 6.9 G/DL (ref 6.4–8.3)
RBC # BLD AUTO: 4.3 M/UL (ref 3.8–5.8)
SODIUM SERPL-SCNC: 137 MMOL/L (ref 136–145)
T4 FREE SERPL-MCNC: 1.2 NG/DL (ref 0.9–1.7)
TRIGL SERPL-MCNC: 75 MG/DL
TSH SERPL DL<=0.05 MIU/L-ACNC: 1.35 UIU/ML (ref 0.27–4.2)
VLDLC SERPL CALC-MCNC: 15 MG/DL
WBC OTHER # BLD: 8.7 K/UL (ref 4.5–11.5)

## 2025-07-23 PROCEDURE — 2500000003 HC RX 250 WO HCPCS: Performed by: NURSE PRACTITIONER

## 2025-07-23 PROCEDURE — 6360000002 HC RX W HCPCS: Performed by: NURSE PRACTITIONER

## 2025-07-23 PROCEDURE — 36415 COLL VENOUS BLD VENIPUNCTURE: CPT

## 2025-07-23 PROCEDURE — 6370000000 HC RX 637 (ALT 250 FOR IP): Performed by: NURSE PRACTITIONER

## 2025-07-23 PROCEDURE — 85025 COMPLETE CBC W/AUTO DIFF WBC: CPT

## 2025-07-23 PROCEDURE — 71046 X-RAY EXAM CHEST 2 VIEWS: CPT

## 2025-07-23 PROCEDURE — 84443 ASSAY THYROID STIM HORMONE: CPT

## 2025-07-23 PROCEDURE — 84439 ASSAY OF FREE THYROXINE: CPT

## 2025-07-23 PROCEDURE — 80061 LIPID PANEL: CPT

## 2025-07-23 PROCEDURE — 82248 BILIRUBIN DIRECT: CPT

## 2025-07-23 PROCEDURE — 1200000000 HC SEMI PRIVATE

## 2025-07-23 PROCEDURE — 6370000000 HC RX 637 (ALT 250 FOR IP)

## 2025-07-23 PROCEDURE — 94640 AIRWAY INHALATION TREATMENT: CPT

## 2025-07-23 PROCEDURE — 83735 ASSAY OF MAGNESIUM: CPT

## 2025-07-23 PROCEDURE — 80053 COMPREHEN METABOLIC PANEL: CPT

## 2025-07-23 PROCEDURE — 2580000003 HC RX 258: Performed by: NURSE PRACTITIONER

## 2025-07-23 RX ORDER — DOXYCYCLINE 100 MG/1
100 CAPSULE ORAL EVERY 12 HOURS SCHEDULED
Status: DISCONTINUED | OUTPATIENT
Start: 2025-07-23 | End: 2025-07-23

## 2025-07-23 RX ORDER — DOXYCYCLINE 100 MG/1
100 CAPSULE ORAL EVERY 12 HOURS SCHEDULED
Status: DISCONTINUED | OUTPATIENT
Start: 2025-07-23 | End: 2025-07-24 | Stop reason: HOSPADM

## 2025-07-23 RX ADMIN — METOPROLOL SUCCINATE 25 MG: 25 TABLET, EXTENDED RELEASE ORAL at 10:30

## 2025-07-23 RX ADMIN — ASPIRIN 81 MG: 81 TABLET, CHEWABLE ORAL at 10:30

## 2025-07-23 RX ADMIN — BUDESONIDE 500 MCG: 0.5 INHALANT RESPIRATORY (INHALATION) at 06:49

## 2025-07-23 RX ADMIN — ALBUTEROL SULFATE 2.5 MG: 2.5 SOLUTION RESPIRATORY (INHALATION) at 06:49

## 2025-07-23 RX ADMIN — DOXYCYCLINE 100 MG: 100 INJECTION, POWDER, LYOPHILIZED, FOR SOLUTION INTRAVENOUS at 04:16

## 2025-07-23 RX ADMIN — SODIUM CHLORIDE, PRESERVATIVE FREE 10 ML: 5 INJECTION INTRAVENOUS at 10:33

## 2025-07-23 RX ADMIN — LISINOPRIL 20 MG: 20 TABLET ORAL at 10:30

## 2025-07-23 RX ADMIN — FUROSEMIDE 20 MG: 20 TABLET ORAL at 10:30

## 2025-07-23 RX ADMIN — SODIUM CHLORIDE, PRESERVATIVE FREE 10 ML: 5 INJECTION INTRAVENOUS at 20:06

## 2025-07-23 RX ADMIN — WATER 2000 MG: 1 INJECTION INTRAMUSCULAR; INTRAVENOUS; SUBCUTANEOUS at 04:15

## 2025-07-23 RX ADMIN — BUDESONIDE 500 MCG: 0.5 INHALANT RESPIRATORY (INHALATION) at 17:00

## 2025-07-23 RX ADMIN — ARFORMOTEROL TARTRATE 15 MCG: 15 SOLUTION RESPIRATORY (INHALATION) at 06:49

## 2025-07-23 RX ADMIN — ENOXAPARIN SODIUM 60 MG: 100 INJECTION SUBCUTANEOUS at 10:31

## 2025-07-23 RX ADMIN — GUAIFENESIN 1200 MG: 600 TABLET, EXTENDED RELEASE ORAL at 10:30

## 2025-07-23 RX ADMIN — ENOXAPARIN SODIUM 60 MG: 100 INJECTION SUBCUTANEOUS at 20:06

## 2025-07-23 RX ADMIN — GUAIFENESIN 1200 MG: 600 TABLET, EXTENDED RELEASE ORAL at 20:06

## 2025-07-23 RX ADMIN — DOXYCYCLINE HYCLATE 100 MG: 100 CAPSULE ORAL at 20:06

## 2025-07-23 RX ADMIN — ARFORMOTEROL TARTRATE 15 MCG: 15 SOLUTION RESPIRATORY (INHALATION) at 17:00

## 2025-07-23 ASSESSMENT — ENCOUNTER SYMPTOMS
SORE THROAT: 0
COUGH: 0
PHOTOPHOBIA: 0
ABDOMINAL PAIN: 0
SHORTNESS OF BREATH: 1
VOMITING: 0
DIARRHEA: 0
BACK PAIN: 0
CONSTIPATION: 0
WHEEZING: 0
RHINORRHEA: 0

## 2025-07-23 NOTE — PROGRESS NOTES
Internal Medicine Consult Note    [facility]    Karan Rodriguez D.O., NELII.                    Hayder Zhang D.O., NELII.                             Timur Hoffmann D.O.     Silvio Billings, MSN, APRN-CNP  Santos Mansfield, MSN, APRN-CNP  Fiona Guzman, MSN. APRN-NP-C  Elizabeth Nunez MSN, APRN, ACNP-AG     Primary Care Physician: Flip Minaya PA-C   Admitting Physician:  Hayder Zhang DO  Admission date and time: 7/21/2025  7:46 PM    Room:  [address] [place] [address]  Admitting diagnosis: Community acquired bacterial pneumonia [J15.9]  Acute respiratory failure with hypoxia (HCC) [J96.01]  Pneumonia of right middle lobe due to infectious organism [J18.9]    Patient Name: Harvinder Campos  MRN: [id number]    Date of Service: 7/23/2025     Subjective:  Harvinder is a 35 y.o. male who was seen and examined today,7/23/2025, at the bedside.  Patient resting comfortably present time.  Patient seems to be breathing easier today.  Not on oxygen.  Patient anxious for possible discharge.  Will repeat chest x-ray and do pulmonary walk    No family present during my examination.    Review of System:   Constitutional:   Denies fever or chills, weight loss or gain, fatigue or malaise.  HEENT:   Denies ear pain, sore throat, sinus or eye problems.  Cardiovascular:   Denies any chest pain, irregular heartbeats, or palpitations.   Respiratory:   Improved pulmonary  Gastrointestinal:   Denies nausea, vomiting, diarrhea, or constipation.  Denies any abdominal pain.  Genitourinary:    Denies any urgency, frequency, hematuria. Voiding  without difficulty.  Extremities:   Denies lower extremity swelling, edema or cyanosis.   Neurology:    Denies any headache or focal neurological deficits, Denies generalized weakness or memory difficulty.   Psch:   Denies being anxious or depressed.  Musculoskeletal:    Denies  myalgias, joint complaints or back pain.   Integumentary:   Denies any rashes, ulcers, or excoriations.  Denies

## 2025-07-23 NOTE — CARE COORDINATION
Patient admitted from home, independent.  He does not have health insurance.  PBD did meet with him and notes reviewed.  Patient is over income for Medicaid, so will remain self pay.  He is not eligible for help with medications on DC.  Will be self pay.  He is currently on Room air, Does see MERVIN Gil for primary care and was at the office this week.  Will go home at [place], no needs currently  noted.

## 2025-07-23 NOTE — PROGRESS NOTES
4 Eyes Skin Assessment     NAME:  Harvinder Campos  YOB: 1990  MEDICAL RECORD NUMBER:  [id number]    The patient is being assessed for  Admission    I agree that at least one RN has performed a thorough Head to Toe Skin Assessment on the patient. ALL assessment sites listed below have been assessed.      Areas assessed by both nurses:    Head, Face, Ears, Shoulders, Back, Chest, and Arms, Elbows, Hands  Pt refused to change into gown and refused skin assessment from lower abdomen down  Does the Patient have a Wound? No noted wound(s)       Karri Prevention initiated by RN: No  Wound Care Orders initiated by RN: No    For hospital-acquired stage 1 & 2 and ALL Stage 3,4, Unstageable, DTI, NWPT, and Complex wounds: place order “IP Wound Care/Ostomy Nurse Eval and Treat” by RN under : No    New Ostomies, if present place, Ostomy referral order under : No     Nurse 1 eSignature: Electronically signed by Patsy Florez RN on 7/23/25 at 6:52 AM EDT    **SHARE this note so that the co-signing nurse can place an eSignature**    Nurse 2 eSignature: Electronically signed by Karen Macias RN on 7/23/25 at 7:23 AM EDT

## 2025-07-23 NOTE — PLAN OF CARE
Problem: ABCDS Injury Assessment  Goal: Absence of physical injury  7/23/2025 1422 by Reyna Boston RN  Outcome: Progressing  7/23/2025 0229 by Patsy Florez RN  Outcome: Progressing     Problem: Chronic Conditions and Co-morbidities  Goal: Patient's chronic conditions and co-morbidity symptoms are monitored and maintained or improved  7/23/2025 1422 by Reyna Boston RN  Outcome: Progressing  7/23/2025 0229 by Patsy Florez RN  Outcome: Progressing     Problem: Discharge Planning  Goal: Discharge to home or other facility with appropriate resources  7/23/2025 1422 by Reyna Boston RN  Outcome: Progressing  7/23/2025 0229 by Patsy Florez RN  Outcome: Progressing     Problem: Respiratory - Adult  Goal: Achieves optimal ventilation and oxygenation  Outcome: Progressing     Problem: Skin/Tissue Integrity - Adult  Goal: Skin integrity remains intact  Outcome: Progressing     Problem: Gastrointestinal - Adult  Goal: Maintains adequate nutritional intake  Outcome: Progressing

## 2025-07-24 VITALS
OXYGEN SATURATION: 94 % | WEIGHT: 315 LBS | TEMPERATURE: 97.6 F | RESPIRATION RATE: 17 BRPM | HEIGHT: 74 IN | SYSTOLIC BLOOD PRESSURE: 127 MMHG | HEART RATE: 65 BPM | BODY MASS INDEX: 40.43 KG/M2 | DIASTOLIC BLOOD PRESSURE: 88 MMHG

## 2025-07-24 LAB
ALBUMIN SERPL-MCNC: 3.6 G/DL (ref 3.5–5.2)
ALP SERPL-CCNC: 52 U/L (ref 40–129)
ALT SERPL-CCNC: 30 U/L (ref 0–50)
ANION GAP SERPL CALCULATED.3IONS-SCNC: 11 MMOL/L (ref 7–16)
AST SERPL-CCNC: 22 U/L (ref 0–50)
BASOPHILS # BLD: 0.09 K/UL (ref 0–0.2)
BASOPHILS NFR BLD: 1 % (ref 0–2)
BILIRUB DIRECT SERPL-MCNC: 0.1 MG/DL (ref 0–0.2)
BILIRUB INDIRECT SERPL-MCNC: 0.2 MG/DL (ref 0–1)
BILIRUB SERPL-MCNC: 0.4 MG/DL (ref 0–1.2)
BUN SERPL-MCNC: 9 MG/DL (ref 6–20)
CALCIUM SERPL-MCNC: 9.3 MG/DL (ref 8.6–10)
CHLORIDE SERPL-SCNC: 103 MMOL/L (ref 98–107)
CO2 SERPL-SCNC: 23 MMOL/L (ref 22–29)
CREAT SERPL-MCNC: 0.7 MG/DL (ref 0.7–1.2)
EOSINOPHIL # BLD: 0.22 K/UL (ref 0.05–0.5)
EOSINOPHILS RELATIVE PERCENT: 2 % (ref 0–6)
ERYTHROCYTE [DISTWIDTH] IN BLOOD BY AUTOMATED COUNT: 14.4 % (ref 11.5–15)
GFR, ESTIMATED: >90 ML/MIN/1.73M2
GLUCOSE SERPL-MCNC: 95 MG/DL (ref 74–99)
HCT VFR BLD AUTO: 42.2 % (ref 37–54)
HGB BLD-MCNC: 13.5 G/DL (ref 12.5–16.5)
IMM GRANULOCYTES # BLD AUTO: 0.03 K/UL (ref 0–0.58)
IMM GRANULOCYTES NFR BLD: 0 % (ref 0–5)
LYMPHOCYTES NFR BLD: 2.83 K/UL (ref 1.5–4)
LYMPHOCYTES RELATIVE PERCENT: 28 % (ref 20–42)
MAGNESIUM SERPL-MCNC: 1.9 MG/DL (ref 1.6–2.6)
MCH RBC QN AUTO: 30.9 PG (ref 26–35)
MCHC RBC AUTO-ENTMCNC: 32 G/DL (ref 32–34.5)
MCV RBC AUTO: 96.6 FL (ref 80–99.9)
MONOCYTES NFR BLD: 0.64 K/UL (ref 0.1–0.95)
MONOCYTES NFR BLD: 6 % (ref 2–12)
NEUTROPHILS NFR BLD: 62 % (ref 43–80)
NEUTS SEG NFR BLD: 6.26 K/UL (ref 1.8–7.3)
PLATELET # BLD AUTO: 261 K/UL (ref 130–450)
PMV BLD AUTO: 10.1 FL (ref 7–12)
POTASSIUM SERPL-SCNC: 4.2 MMOL/L (ref 3.5–5.1)
PROCALCITONIN SERPL-MCNC: 0.03 NG/ML (ref 0–0.08)
PROT SERPL-MCNC: 6.7 G/DL (ref 6.4–8.3)
RBC # BLD AUTO: 4.37 M/UL (ref 3.8–5.8)
SODIUM SERPL-SCNC: 137 MMOL/L (ref 136–145)
WBC OTHER # BLD: 10.1 K/UL (ref 4.5–11.5)

## 2025-07-24 PROCEDURE — 94640 AIRWAY INHALATION TREATMENT: CPT

## 2025-07-24 PROCEDURE — 85025 COMPLETE CBC W/AUTO DIFF WBC: CPT

## 2025-07-24 PROCEDURE — 83735 ASSAY OF MAGNESIUM: CPT

## 2025-07-24 PROCEDURE — 6370000000 HC RX 637 (ALT 250 FOR IP)

## 2025-07-24 PROCEDURE — 84145 PROCALCITONIN (PCT): CPT

## 2025-07-24 PROCEDURE — 36415 COLL VENOUS BLD VENIPUNCTURE: CPT

## 2025-07-24 PROCEDURE — 6360000002 HC RX W HCPCS: Performed by: NURSE PRACTITIONER

## 2025-07-24 PROCEDURE — 6370000000 HC RX 637 (ALT 250 FOR IP): Performed by: NURSE PRACTITIONER

## 2025-07-24 PROCEDURE — 2500000003 HC RX 250 WO HCPCS: Performed by: NURSE PRACTITIONER

## 2025-07-24 PROCEDURE — 80053 COMPREHEN METABOLIC PANEL: CPT

## 2025-07-24 PROCEDURE — 82248 BILIRUBIN DIRECT: CPT

## 2025-07-24 RX ORDER — DOXYCYCLINE 100 MG/1
100 CAPSULE ORAL 2 TIMES DAILY
Qty: 10 CAPSULE | Refills: 0 | Status: SHIPPED | OUTPATIENT
Start: 2025-07-24 | End: 2025-07-29

## 2025-07-24 RX ORDER — ASPIRIN 81 MG/1
81 TABLET, CHEWABLE ORAL DAILY
COMMUNITY
Start: 2025-07-24

## 2025-07-24 RX ADMIN — METOPROLOL SUCCINATE 25 MG: 25 TABLET, EXTENDED RELEASE ORAL at 09:05

## 2025-07-24 RX ADMIN — LISINOPRIL 20 MG: 20 TABLET ORAL at 09:05

## 2025-07-24 RX ADMIN — FUROSEMIDE 20 MG: 20 TABLET ORAL at 09:05

## 2025-07-24 RX ADMIN — WATER 2000 MG: 1 INJECTION INTRAMUSCULAR; INTRAVENOUS; SUBCUTANEOUS at 03:15

## 2025-07-24 RX ADMIN — ENOXAPARIN SODIUM 60 MG: 100 INJECTION SUBCUTANEOUS at 09:04

## 2025-07-24 RX ADMIN — BUDESONIDE 500 MCG: 0.5 INHALANT RESPIRATORY (INHALATION) at 06:03

## 2025-07-24 RX ADMIN — ARFORMOTEROL TARTRATE 15 MCG: 15 SOLUTION RESPIRATORY (INHALATION) at 06:03

## 2025-07-24 RX ADMIN — GUAIFENESIN 1200 MG: 600 TABLET, EXTENDED RELEASE ORAL at 09:05

## 2025-07-24 RX ADMIN — ASPIRIN 81 MG: 81 TABLET, CHEWABLE ORAL at 09:05

## 2025-07-24 RX ADMIN — DOXYCYCLINE HYCLATE 100 MG: 100 CAPSULE ORAL at 09:05

## 2025-07-24 RX ADMIN — SODIUM CHLORIDE, PRESERVATIVE FREE 10 ML: 5 INJECTION INTRAVENOUS at 09:05

## 2025-07-24 NOTE — PLAN OF CARE
Problem: ABCDS Injury Assessment  Goal: Absence of physical injury  7/24/2025 0954 by Juhi Carney, RN  Outcome: Completed  7/24/2025 0731 by Juhi Carney, RN  Outcome: Progressing  7/24/2025 0124 by Silvia Langston, RN  Outcome: Progressing

## 2025-07-24 NOTE — DISCHARGE INSTRUCTIONS
Your information:  Name: Harvinder Campos  : 1990    Your instructions:  Discharge home with homecare  Follow up with your family doctor in 1 week    What to do after you leave the hospital:    Recommended diet: regular diet    Recommended activity: activity as tolerated    Signs and symptoms to watch out for:   Call 911 anytime you think you may need emergency care. For example, call if:    You have severe trouble breathing.   Call your doctor now or seek immediate medical care if:    You cough up dark brown or bloody mucus (sputum).     You have new or worse trouble breathing.     You are dizzy or lightheaded, or you feel like you may faint.   Watch closely for changes in your health, and be sure to contact your doctor if:    You have a new or higher fever.     You are coughing more deeply or more often.     You are not getting better after 2 days (48 hours).     You do not get better as expected.       The following personal items were collected during your admission and were returned to you:    Belongings  Dental Appliances: None  Vision - Corrective Lenses: None  Hearing Aid: None  Clothing: Shorts, Shirt, Socks, Footwear, Undergarments, At bedside  Jewelry: None  Body Piercings Removed: N/A  Electronic Devices: Cell Phone, Ear Phones/Buds, , Tablet, At bedside  Weapons (Notify Protective Services/Security): None  Other Valuables: Wallet, Other (Comment), At bedside, Money (bag)  Home Medications: None  Valuables Given To: Patient  Provide Name(s) of Who Valuable(s) Were Given To: patient  Responsible person(s) in the waiting room: na  Patient approves for provider to speak to responsible person post operatively: Yes    Information obtained by:  By signing below, I understand that if any problems occur once I leave the hospital I am to contact PCP.  I understand and acknowledge receipt of the instructions indicated above.

## 2025-07-24 NOTE — PLAN OF CARE
Problem: ABCDS Injury Assessment  Goal: Absence of physical injury  7/24/2025 0731 by Juhi Carney, RN  Outcome: Progressing  7/24/2025 0124 by Silvia Langston, RN  Outcome: Progressing

## 2025-07-24 NOTE — PROGRESS NOTES
CLINICAL PHARMACY NOTE: MEDS TO BEDS    Total # of Prescriptions Filled: 1   The following medications were delivered to the patient:  Doxycycline hyclate 100 mg    Additional Documentation:

## 2025-07-24 NOTE — PLAN OF CARE
Problem: ABCDS Injury Assessment  Goal: Absence of physical injury  7/24/2025 0124 by Silvia Langston RN  Outcome: Progressing  7/23/2025 1422 by Reyna Boston RN  Outcome: Progressing     Problem: Chronic Conditions and Co-morbidities  Goal: Patient's chronic conditions and co-morbidity symptoms are monitored and maintained or improved  7/24/2025 0124 by Silvia Langston RN  Outcome: Progressing  7/23/2025 1422 by Reyna Boston RN  Outcome: Progressing     Problem: Discharge Planning  Goal: Discharge to home or other facility with appropriate resources  7/24/2025 0124 by Silvia Langston RN  Outcome: Progressing  7/23/2025 1422 by Reyna Boston RN  Outcome: Progressing     Problem: Respiratory - Adult  Goal: Achieves optimal ventilation and oxygenation  7/24/2025 0124 by Silvia Langston RN  Outcome: Progressing  7/23/2025 1422 by Reyna Boston RN  Outcome: Progressing     Problem: Skin/Tissue Integrity - Adult  Goal: Skin integrity remains intact  7/24/2025 0124 by Silvia Langston RN  Outcome: Progressing  7/23/2025 1422 by Reyna Boston RN  Outcome: Progressing     Problem: Gastrointestinal - Adult  Goal: Maintains adequate nutritional intake  7/24/2025 0124 by Silvia Langston RN  Outcome: Progressing  7/23/2025 1422 by Reyna Boston RN  Outcome: Progressing

## 2025-07-24 NOTE — CARE COORDINATION
DC order noted.  Plan home, Patient admitted from home, independent.  He does not have health insurance.  PBD did meet with him and notes reviewed.  Patient is over income for Medicaid, so will remain self pay.  He is not eligible for help with medications on DC.  Will be self pay.  Noted plan oral Doxy at [place].

## 2025-07-25 ENCOUNTER — TELEPHONE (OUTPATIENT)
Dept: PRIMARY CARE CLINIC | Age: 35
End: 2025-07-25

## 2025-07-25 NOTE — TELEPHONE ENCOUNTER
Care Transitions Initial Follow Up Call    Outreach made within 2 business days of discharge: Yes    Patient: Harvinder Campos Patient : 1990   MRN: [id number]  Reason for Admission: Community Aquired Pneumonia     Discharge Date: 25       Spoke with: Patient     Discharge department/facility: [facility]     TCM Interactive Patient Contact:  Was patient able to fill all prescriptions: Yes  Was patient instructed to bring all medications to the follow-up visit: Yes  Is patient taking all medications as directed in the discharge summary? Yes  Does patient understand their discharge instructions: Yes  Does patient have questions or concerns that need addressed prior to 7-14 day follow up office visit: no    Additional needs identified to be addressed with provider  No needs identified             Scheduled appointment with PCP within 7-14 days    Follow Up  Future Appointments   Date Time Provider Department Center   8/15/2025  3:30 PM [facility]       Violet Marks MA

## 2025-07-25 NOTE — DISCHARGE SUMMARY
[address] [address]                            DISCHARGE SUMMARY      PATIENT NAME: RACHEL GALLAGHER               : 1990  MED REC NO: [id number]                        ROOM: 0338  ACCOUNT NO: [id number]                       ADMIT DATE: 2025  PROVIDER: Karan Rodriguez DO      PRIMARY CARE PHYSICIAN:  Dr. Flip Minaya    ADMITTING DIAGNOSIS:  Dyspnea.    FINAL DISCHARGE DIAGNOSES:  Sepsis secondary to community-acquired pneumonia with exertional hypoxemia resolved at the time of discharge.    SECONDARY DISCHARGE DIAGNOSES:  Severe obstructive sleep apnea with nocturnal NIPPV with intermittent compliance, BiPAP 19/15.  Chronic compensated systolic and diastolic congestive heart failure.  Essential hypertension.  Super morbid obesity with body mass index of 63.68.    COMPLICATION:  None.    OPERATION:  None.    CONSULTATION OBTAINED:  With no one.    ADDITIONAL ADMITTING PHYSICIAN:  Dr. Rodriguez.    CHIEF COMPLAINT AND HISTORY OF CHIEF COMPLAINT:  This is a 35-year-old white male who was admitted to [facility].  The patient presented to the hospital here on 2025.  The patient presented to the hospital here with what appeared to be increased amount of shortness of breath.  The patient was evaluated here with complaints of cough, shortness of breath, and upper respiratory symptoms.  The patient reported these to his primary care provider.  The patient got progressively worse and referred to the hospital here at [facility].  He was seen, evaluated, and subsequently admitted to the hospital at this time under the service of Dr. Rodriguez and Dr. Zhang.  The patient was seen.    PAST MEDICAL HISTORY:  Positive for usual childhood diseases, history of cellulitis of leg, class 3 obesity, obstructive lung disease, sleep apnea, systolic and diastolic congestive heart failure.    PHYSICAL EXAM:  VITAL SIGNS:  At this

## 2025-07-26 LAB
EKG ATRIAL RATE: 107 BPM
EKG P AXIS: 31 DEGREES
EKG P-R INTERVAL: 152 MS
EKG Q-T INTERVAL: 358 MS
EKG QRS DURATION: 92 MS
EKG QTC CALCULATION (BAZETT): 477 MS
EKG R AXIS: -9 DEGREES
EKG T AXIS: 22 DEGREES
EKG VENTRICULAR RATE: 107 BPM

## 2025-07-27 LAB
MICROORGANISM SPEC CULT: NORMAL
MICROORGANISM SPEC CULT: NORMAL
SERVICE CMNT-IMP: NORMAL
SERVICE CMNT-IMP: NORMAL
SPECIMEN DESCRIPTION: NORMAL
SPECIMEN DESCRIPTION: NORMAL

## 2025-08-15 ENCOUNTER — OFFICE VISIT (OUTPATIENT)
Dept: PRIMARY CARE CLINIC | Age: 35
End: 2025-08-15

## 2025-08-15 VITALS
HEART RATE: 80 BPM | SYSTOLIC BLOOD PRESSURE: 114 MMHG | WEIGHT: 315 LBS | HEIGHT: 74 IN | OXYGEN SATURATION: 92 % | BODY MASS INDEX: 40.43 KG/M2 | TEMPERATURE: 98.1 F | DIASTOLIC BLOOD PRESSURE: 80 MMHG

## 2025-08-15 DIAGNOSIS — Z09 HOSPITAL DISCHARGE FOLLOW-UP: Primary | ICD-10-CM

## 2025-08-15 DIAGNOSIS — I50.22 CHRONIC SYSTOLIC CONGESTIVE HEART FAILURE (HCC): ICD-10-CM

## 2025-08-15 DIAGNOSIS — R06.02 SOB (SHORTNESS OF BREATH): ICD-10-CM

## 2025-08-15 PROCEDURE — 3074F SYST BP LT 130 MM HG: CPT

## 2025-08-15 PROCEDURE — 99214 OFFICE O/P EST MOD 30 MIN: CPT

## 2025-08-15 PROCEDURE — 3079F DIAST BP 80-89 MM HG: CPT

## 2025-08-15 PROCEDURE — 1111F DSCHRG MED/CURRENT MED MERGE: CPT

## 2025-08-15 RX ORDER — ALBUTEROL SULFATE 90 UG/1
2 INHALANT RESPIRATORY (INHALATION) 4 TIMES DAILY PRN
Qty: 18 G | Refills: 1 | Status: SHIPPED | OUTPATIENT
Start: 2025-08-15

## 2025-08-19 ASSESSMENT — ENCOUNTER SYMPTOMS
SHORTNESS OF BREATH: 0
COUGH: 0
SORE THROAT: 0
ABDOMINAL PAIN: 0
VOMITING: 0
DIARRHEA: 0
PHOTOPHOBIA: 0
WHEEZING: 0
CONSTIPATION: 0
BACK PAIN: 0
RHINORRHEA: 0